# Patient Record
Sex: MALE | Race: WHITE | NOT HISPANIC OR LATINO | Employment: OTHER | URBAN - METROPOLITAN AREA
[De-identification: names, ages, dates, MRNs, and addresses within clinical notes are randomized per-mention and may not be internally consistent; named-entity substitution may affect disease eponyms.]

---

## 2022-11-15 ENCOUNTER — APPOINTMENT (EMERGENCY)
Dept: RADIOLOGY | Facility: HOSPITAL | Age: 47
End: 2022-11-15

## 2022-11-15 ENCOUNTER — HOSPITAL ENCOUNTER (INPATIENT)
Facility: HOSPITAL | Age: 47
LOS: 1 days | Discharge: HOME | End: 2022-11-16
Attending: EMERGENCY MEDICINE | Admitting: STUDENT IN AN ORGANIZED HEALTH CARE EDUCATION/TRAINING PROGRAM

## 2022-11-15 ENCOUNTER — APPOINTMENT (INPATIENT)
Dept: CT IMAGING | Facility: HOSPITAL | Age: 47
End: 2022-11-15

## 2022-11-15 DIAGNOSIS — I16.0 HYPERTENSIVE URGENCY: ICD-10-CM

## 2022-11-15 DIAGNOSIS — N18.6 ESRD (END STAGE RENAL DISEASE) (HCC): ICD-10-CM

## 2022-11-15 DIAGNOSIS — I10 HYPERTENSION: Primary | ICD-10-CM

## 2022-11-15 DIAGNOSIS — R77.8 ELEVATED TROPONIN: ICD-10-CM

## 2022-11-15 DIAGNOSIS — R10.9 ABDOMINAL PAIN, UNSPECIFIED ABDOMINAL LOCATION: ICD-10-CM

## 2022-11-15 PROBLEM — E11.9 T2DM (TYPE 2 DIABETES MELLITUS) (HCC): Status: ACTIVE | Noted: 2022-01-01

## 2022-11-15 PROBLEM — R05.9 COUGH: Status: ACTIVE | Noted: 2022-11-15

## 2022-11-15 PROBLEM — I50.9 CHF (CONGESTIVE HEART FAILURE) (HCC): Status: ACTIVE | Noted: 2022-11-15

## 2022-11-15 LAB
2HR DELTA HS TROPONIN: 6 NG/L
4HR DELTA HS TROPONIN: 3 NG/L
ALBUMIN SERPL BCP-MCNC: 4.4 G/DL (ref 3.5–5)
ALP SERPL-CCNC: 64 U/L (ref 46–116)
ALT SERPL W P-5'-P-CCNC: 11 U/L (ref 12–78)
ANION GAP SERPL CALCULATED.3IONS-SCNC: 12 MMOL/L (ref 4–13)
AST SERPL W P-5'-P-CCNC: 14 U/L (ref 5–45)
BASOPHILS # BLD AUTO: 0.05 THOUSANDS/ÂΜL (ref 0–0.1)
BASOPHILS NFR BLD AUTO: 1 % (ref 0–1)
BILIRUB SERPL-MCNC: 0.92 MG/DL (ref 0.2–1)
BUN SERPL-MCNC: 38 MG/DL (ref 5–25)
CALCIUM SERPL-MCNC: 9.2 MG/DL (ref 8.3–10.1)
CARDIAC TROPONIN I PNL SERPL HS: 46 NG/L
CARDIAC TROPONIN I PNL SERPL HS: 49 NG/L
CARDIAC TROPONIN I PNL SERPL HS: 52 NG/L
CHLORIDE SERPL-SCNC: 93 MMOL/L (ref 96–108)
CO2 SERPL-SCNC: 31 MMOL/L (ref 21–32)
CREAT SERPL-MCNC: 6.45 MG/DL (ref 0.6–1.3)
EOSINOPHIL # BLD AUTO: 0.27 THOUSAND/ÂΜL (ref 0–0.61)
EOSINOPHIL NFR BLD AUTO: 6 % (ref 0–6)
ERYTHROCYTE [DISTWIDTH] IN BLOOD BY AUTOMATED COUNT: 13.8 % (ref 11.6–15.1)
FLUAV RNA RESP QL NAA+PROBE: NEGATIVE
FLUBV RNA RESP QL NAA+PROBE: NEGATIVE
GFR SERPL CREATININE-BSD FRML MDRD: 9 ML/MIN/1.73SQ M
GLUCOSE SERPL-MCNC: 90 MG/DL (ref 65–140)
HCT VFR BLD AUTO: 37.6 % (ref 36.5–49.3)
HGB BLD-MCNC: 11.9 G/DL (ref 12–17)
IMM GRANULOCYTES # BLD AUTO: 0.01 THOUSAND/UL (ref 0–0.2)
IMM GRANULOCYTES NFR BLD AUTO: 0 % (ref 0–2)
LYMPHOCYTES # BLD AUTO: 0.48 THOUSANDS/ÂΜL (ref 0.6–4.47)
LYMPHOCYTES NFR BLD AUTO: 11 % (ref 14–44)
MCH RBC QN AUTO: 30.6 PG (ref 26.8–34.3)
MCHC RBC AUTO-ENTMCNC: 31.6 G/DL (ref 31.4–37.4)
MCV RBC AUTO: 97 FL (ref 82–98)
MONOCYTES # BLD AUTO: 0.38 THOUSAND/ÂΜL (ref 0.17–1.22)
MONOCYTES NFR BLD AUTO: 8 % (ref 4–12)
NEUTROPHILS # BLD AUTO: 3.31 THOUSANDS/ÂΜL (ref 1.85–7.62)
NEUTS SEG NFR BLD AUTO: 74 % (ref 43–75)
NRBC BLD AUTO-RTO: 0 /100 WBCS
PLATELET # BLD AUTO: 104 THOUSANDS/UL (ref 149–390)
PMV BLD AUTO: 11.1 FL (ref 8.9–12.7)
POTASSIUM SERPL-SCNC: 3.8 MMOL/L (ref 3.5–5.3)
PROT SERPL-MCNC: 7.5 G/DL (ref 6.4–8.4)
RBC # BLD AUTO: 3.89 MILLION/UL (ref 3.88–5.62)
RSV RNA RESP QL NAA+PROBE: NEGATIVE
SARS-COV-2 RNA RESP QL NAA+PROBE: NEGATIVE
SODIUM SERPL-SCNC: 136 MMOL/L (ref 135–147)
WBC # BLD AUTO: 4.5 THOUSAND/UL (ref 4.31–10.16)

## 2022-11-15 PROCEDURE — 5A1D70Z PERFORMANCE OF URINARY FILTRATION, INTERMITTENT, LESS THAN 6 HOURS PER DAY: ICD-10-PCS | Performed by: STUDENT IN AN ORGANIZED HEALTH CARE EDUCATION/TRAINING PROGRAM

## 2022-11-15 RX ORDER — NIFEDIPINE 60 MG/1
60 TABLET, EXTENDED RELEASE ORAL 2 TIMES DAILY
Status: DISCONTINUED | OUTPATIENT
Start: 2022-11-15 | End: 2022-11-16 | Stop reason: HOSPADM

## 2022-11-15 RX ORDER — CLONIDINE 0.3 MG/24H
1 PATCH, EXTENDED RELEASE TRANSDERMAL WEEKLY
COMMUNITY
End: 2022-11-16 | Stop reason: SDUPTHER

## 2022-11-15 RX ORDER — ISOSORBIDE MONONITRATE 30 MG/1
30 TABLET, EXTENDED RELEASE ORAL DAILY
COMMUNITY
End: 2022-11-16 | Stop reason: SDUPTHER

## 2022-11-15 RX ORDER — MINOXIDIL 2.5 MG/1
2.5 TABLET ORAL 2 TIMES DAILY
COMMUNITY
End: 2022-11-16 | Stop reason: SDUPTHER

## 2022-11-15 RX ORDER — LOSARTAN POTASSIUM 50 MG/1
100 TABLET ORAL DAILY
Status: DISCONTINUED | OUTPATIENT
Start: 2022-11-16 | End: 2022-11-16 | Stop reason: HOSPADM

## 2022-11-15 RX ORDER — LABETALOL 200 MG/1
600 TABLET, FILM COATED ORAL 2 TIMES DAILY
COMMUNITY
End: 2022-11-16 | Stop reason: SDUPTHER

## 2022-11-15 RX ORDER — ATORVASTATIN CALCIUM 40 MG/1
80 TABLET, FILM COATED ORAL
Status: DISCONTINUED | OUTPATIENT
Start: 2022-11-16 | End: 2022-11-16 | Stop reason: HOSPADM

## 2022-11-15 RX ORDER — ATORVASTATIN CALCIUM 80 MG/1
80 TABLET, FILM COATED ORAL DAILY
COMMUNITY
End: 2022-11-16 | Stop reason: SDUPTHER

## 2022-11-15 RX ORDER — HYDROMORPHONE HCL IN WATER/PF 6 MG/30 ML
0.2 PATIENT CONTROLLED ANALGESIA SYRINGE INTRAVENOUS ONCE
Status: COMPLETED | OUTPATIENT
Start: 2022-11-15 | End: 2022-11-15

## 2022-11-15 RX ORDER — ISOSORBIDE MONONITRATE 30 MG/1
30 TABLET, EXTENDED RELEASE ORAL DAILY
Status: DISCONTINUED | OUTPATIENT
Start: 2022-11-16 | End: 2022-11-16 | Stop reason: HOSPADM

## 2022-11-15 RX ORDER — HEPARIN SODIUM 5000 [USP'U]/ML
5000 INJECTION, SOLUTION INTRAVENOUS; SUBCUTANEOUS EVERY 8 HOURS SCHEDULED
Status: DISCONTINUED | OUTPATIENT
Start: 2022-11-15 | End: 2022-11-16 | Stop reason: HOSPADM

## 2022-11-15 RX ORDER — ONDANSETRON 2 MG/ML
4 INJECTION INTRAMUSCULAR; INTRAVENOUS ONCE
Status: COMPLETED | OUTPATIENT
Start: 2022-11-15 | End: 2022-11-15

## 2022-11-15 RX ORDER — LABETALOL 200 MG/1
600 TABLET, FILM COATED ORAL 2 TIMES DAILY
Status: DISCONTINUED | OUTPATIENT
Start: 2022-11-15 | End: 2022-11-16 | Stop reason: HOSPADM

## 2022-11-15 RX ORDER — ACETAMINOPHEN 325 MG/1
650 TABLET ORAL EVERY 6 HOURS PRN
Status: DISCONTINUED | OUTPATIENT
Start: 2022-11-15 | End: 2022-11-16 | Stop reason: HOSPADM

## 2022-11-15 RX ORDER — MINOXIDIL 2.5 MG/1
2.5 TABLET ORAL 2 TIMES DAILY
Status: DISCONTINUED | OUTPATIENT
Start: 2022-11-15 | End: 2022-11-16 | Stop reason: HOSPADM

## 2022-11-15 RX ORDER — DORZOLAMIDE HYDROCHLORIDE AND TIMOLOL MALEATE 20; 5 MG/ML; MG/ML
1 SOLUTION/ DROPS OPHTHALMIC 2 TIMES DAILY
COMMUNITY

## 2022-11-15 RX ORDER — CALCIUM ACETATE 667 MG/1
667 CAPSULE ORAL 2 TIMES DAILY PRN
Status: DISCONTINUED | OUTPATIENT
Start: 2022-11-15 | End: 2022-11-16 | Stop reason: HOSPADM

## 2022-11-15 RX ORDER — FUROSEMIDE 80 MG
80 TABLET ORAL 2 TIMES DAILY
COMMUNITY
End: 2022-11-16 | Stop reason: SDUPTHER

## 2022-11-15 RX ORDER — HYDRALAZINE HYDROCHLORIDE 20 MG/ML
5 INJECTION INTRAMUSCULAR; INTRAVENOUS EVERY 6 HOURS PRN
Status: DISCONTINUED | OUTPATIENT
Start: 2022-11-15 | End: 2022-11-16 | Stop reason: HOSPADM

## 2022-11-15 RX ORDER — FUROSEMIDE 40 MG/1
80 TABLET ORAL 2 TIMES DAILY
Status: DISCONTINUED | OUTPATIENT
Start: 2022-11-15 | End: 2022-11-16 | Stop reason: HOSPADM

## 2022-11-15 RX ORDER — CALCIUM ACETATE 667 MG/1
TABLET ORAL 2 TIMES DAILY PRN
COMMUNITY

## 2022-11-15 RX ORDER — METOCLOPRAMIDE HYDROCHLORIDE 5 MG/ML
5 INJECTION INTRAMUSCULAR; INTRAVENOUS ONCE
Status: COMPLETED | OUTPATIENT
Start: 2022-11-15 | End: 2022-11-15

## 2022-11-15 RX ORDER — HYDRALAZINE HYDROCHLORIDE 20 MG/ML
20 INJECTION INTRAMUSCULAR; INTRAVENOUS ONCE
Status: COMPLETED | OUTPATIENT
Start: 2022-11-15 | End: 2022-11-15

## 2022-11-15 RX ORDER — ALBUTEROL SULFATE 2.5 MG/3ML
5 SOLUTION RESPIRATORY (INHALATION) ONCE
Status: COMPLETED | OUTPATIENT
Start: 2022-11-15 | End: 2022-11-15

## 2022-11-15 RX ORDER — DIPHENHYDRAMINE HYDROCHLORIDE 50 MG/ML
25 INJECTION INTRAMUSCULAR; INTRAVENOUS ONCE
Status: COMPLETED | OUTPATIENT
Start: 2022-11-15 | End: 2022-11-15

## 2022-11-15 RX ORDER — VALSARTAN 320 MG/1
320 TABLET ORAL DAILY
COMMUNITY
End: 2022-11-16 | Stop reason: SDUPTHER

## 2022-11-15 RX ORDER — NIFEDIPINE 60 MG/1
60 TABLET, FILM COATED, EXTENDED RELEASE ORAL 2 TIMES DAILY
COMMUNITY
End: 2022-11-16 | Stop reason: SDUPTHER

## 2022-11-15 RX ADMIN — ONDANSETRON 4 MG: 2 INJECTION INTRAMUSCULAR; INTRAVENOUS at 20:28

## 2022-11-15 RX ADMIN — METOCLOPRAMIDE 5 MG: 5 INJECTION, SOLUTION INTRAMUSCULAR; INTRAVENOUS at 22:30

## 2022-11-15 RX ADMIN — DIPHENHYDRAMINE HYDROCHLORIDE 25 MG: 50 INJECTION, SOLUTION INTRAMUSCULAR; INTRAVENOUS at 22:28

## 2022-11-15 RX ADMIN — HYDRALAZINE HYDROCHLORIDE 20 MG: 20 INJECTION INTRAMUSCULAR; INTRAVENOUS at 20:05

## 2022-11-15 RX ADMIN — ALBUTEROL SULFATE 5 MG: 2.5 SOLUTION RESPIRATORY (INHALATION) at 20:05

## 2022-11-15 RX ADMIN — HYDROMORPHONE HYDROCHLORIDE 0.2 MG: 0.2 INJECTION, SOLUTION INTRAMUSCULAR; INTRAVENOUS; SUBCUTANEOUS at 22:29

## 2022-11-16 VITALS
RESPIRATION RATE: 28 BRPM | HEIGHT: 68 IN | WEIGHT: 180 LBS | TEMPERATURE: 97.8 F | SYSTOLIC BLOOD PRESSURE: 162 MMHG | DIASTOLIC BLOOD PRESSURE: 96 MMHG | HEART RATE: 90 BPM | OXYGEN SATURATION: 96 % | BODY MASS INDEX: 27.28 KG/M2

## 2022-11-16 PROBLEM — K31.84 GASTROPARESIS: Status: ACTIVE | Noted: 2022-11-15

## 2022-11-16 LAB
ANION GAP SERPL CALCULATED.3IONS-SCNC: 13 MMOL/L (ref 4–13)
BASOPHILS # BLD AUTO: 0.05 THOUSANDS/ÂΜL (ref 0–0.1)
BASOPHILS NFR BLD AUTO: 1 % (ref 0–1)
BUN SERPL-MCNC: 46 MG/DL (ref 5–25)
CALCIUM SERPL-MCNC: 8.8 MG/DL (ref 8.3–10.1)
CHLORIDE SERPL-SCNC: 97 MMOL/L (ref 96–108)
CO2 SERPL-SCNC: 31 MMOL/L (ref 21–32)
CREAT SERPL-MCNC: 7.49 MG/DL (ref 0.6–1.3)
EOSINOPHIL # BLD AUTO: 0.21 THOUSAND/ÂΜL (ref 0–0.61)
EOSINOPHIL NFR BLD AUTO: 4 % (ref 0–6)
ERYTHROCYTE [DISTWIDTH] IN BLOOD BY AUTOMATED COUNT: 13.9 % (ref 11.6–15.1)
GFR SERPL CREATININE-BSD FRML MDRD: 7 ML/MIN/1.73SQ M
GLUCOSE SERPL-MCNC: 71 MG/DL (ref 65–140)
HCT VFR BLD AUTO: 36.3 % (ref 36.5–49.3)
HGB BLD-MCNC: 11.6 G/DL (ref 12–17)
IMM GRANULOCYTES # BLD AUTO: 0.01 THOUSAND/UL (ref 0–0.2)
IMM GRANULOCYTES NFR BLD AUTO: 0 % (ref 0–2)
LYMPHOCYTES # BLD AUTO: 0.69 THOUSANDS/ÂΜL (ref 0.6–4.47)
LYMPHOCYTES NFR BLD AUTO: 14 % (ref 14–44)
MCH RBC QN AUTO: 30.8 PG (ref 26.8–34.3)
MCHC RBC AUTO-ENTMCNC: 32 G/DL (ref 31.4–37.4)
MCV RBC AUTO: 96 FL (ref 82–98)
MONOCYTES # BLD AUTO: 0.55 THOUSAND/ÂΜL (ref 0.17–1.22)
MONOCYTES NFR BLD AUTO: 11 % (ref 4–12)
NEUTROPHILS # BLD AUTO: 3.46 THOUSANDS/ÂΜL (ref 1.85–7.62)
NEUTS SEG NFR BLD AUTO: 70 % (ref 43–75)
NRBC BLD AUTO-RTO: 0 /100 WBCS
PLATELET # BLD AUTO: 114 THOUSANDS/UL (ref 149–390)
PMV BLD AUTO: 10.7 FL (ref 8.9–12.7)
POTASSIUM SERPL-SCNC: 4.9 MMOL/L (ref 3.5–5.3)
RBC # BLD AUTO: 3.77 MILLION/UL (ref 3.88–5.62)
SODIUM SERPL-SCNC: 141 MMOL/L (ref 135–147)
WBC # BLD AUTO: 4.97 THOUSAND/UL (ref 4.31–10.16)

## 2022-11-16 RX ORDER — METOCLOPRAMIDE 10 MG/1
10 TABLET ORAL 4 TIMES DAILY
Qty: 240 TABLET | Refills: 0 | Status: SHIPPED | OUTPATIENT
Start: 2022-11-16 | End: 2023-01-15

## 2022-11-16 RX ORDER — FUROSEMIDE 80 MG
80 TABLET ORAL 2 TIMES DAILY
Qty: 120 TABLET | Refills: 0 | Status: SHIPPED | OUTPATIENT
Start: 2022-11-16 | End: 2023-01-15

## 2022-11-16 RX ORDER — MINOXIDIL 2.5 MG/1
2.5 TABLET ORAL 2 TIMES DAILY
Qty: 120 TABLET | Refills: 0 | Status: SHIPPED | OUTPATIENT
Start: 2022-11-16 | End: 2023-01-15

## 2022-11-16 RX ORDER — CLONIDINE 0.3 MG/24H
1 PATCH, EXTENDED RELEASE TRANSDERMAL WEEKLY
Qty: 8 PATCH | Refills: 0 | Status: SHIPPED | OUTPATIENT
Start: 2022-11-16 | End: 2023-01-15

## 2022-11-16 RX ORDER — DICYCLOMINE HYDROCHLORIDE 10 MG/1
10 CAPSULE ORAL
Qty: 240 CAPSULE | Refills: 0 | Status: SHIPPED | OUTPATIENT
Start: 2022-11-16 | End: 2023-01-15

## 2022-11-16 RX ORDER — NIFEDIPINE 60 MG/1
60 TABLET, FILM COATED, EXTENDED RELEASE ORAL 2 TIMES DAILY
Qty: 120 TABLET | Refills: 0 | Status: SHIPPED | OUTPATIENT
Start: 2022-11-16 | End: 2023-01-15

## 2022-11-16 RX ORDER — ATORVASTATIN CALCIUM 80 MG/1
80 TABLET, FILM COATED ORAL DAILY
Qty: 60 TABLET | Refills: 0 | Status: SHIPPED | OUTPATIENT
Start: 2022-11-16

## 2022-11-16 RX ORDER — ISOSORBIDE MONONITRATE 30 MG/1
30 TABLET, EXTENDED RELEASE ORAL DAILY
Qty: 60 TABLET | Refills: 0 | Status: SHIPPED | OUTPATIENT
Start: 2022-11-16 | End: 2023-01-15

## 2022-11-16 RX ORDER — LABETALOL 200 MG/1
600 TABLET, FILM COATED ORAL 2 TIMES DAILY
Qty: 360 TABLET | Refills: 0 | Status: SHIPPED | OUTPATIENT
Start: 2022-11-16 | End: 2023-01-15

## 2022-11-16 RX ORDER — VALSARTAN 320 MG/1
320 TABLET ORAL DAILY
Qty: 60 TABLET | Refills: 0 | Status: SHIPPED | OUTPATIENT
Start: 2022-11-16 | End: 2023-01-15

## 2022-11-16 RX ADMIN — FUROSEMIDE 80 MG: 40 TABLET ORAL at 00:05

## 2022-11-16 RX ADMIN — LOSARTAN POTASSIUM 100 MG: 50 TABLET, FILM COATED ORAL at 09:33

## 2022-11-16 RX ADMIN — ISOSORBIDE MONONITRATE 30 MG: 30 TABLET, EXTENDED RELEASE ORAL at 09:33

## 2022-11-16 RX ADMIN — MINOXIDIL 2.5 MG: 2.5 TABLET ORAL at 10:23

## 2022-11-16 RX ADMIN — HEPARIN SODIUM 5000 UNITS: 5000 INJECTION INTRAVENOUS; SUBCUTANEOUS at 00:05

## 2022-11-16 RX ADMIN — MINOXIDIL 2.5 MG: 2.5 TABLET ORAL at 00:05

## 2022-11-16 RX ADMIN — HEPARIN SODIUM 5000 UNITS: 5000 INJECTION INTRAVENOUS; SUBCUTANEOUS at 07:10

## 2022-11-16 RX ADMIN — NIFEDIPINE 60 MG: 60 TABLET, FILM COATED, EXTENDED RELEASE ORAL at 10:23

## 2022-11-16 RX ADMIN — NIFEDIPINE 60 MG: 60 TABLET, FILM COATED, EXTENDED RELEASE ORAL at 00:05

## 2022-11-16 RX ADMIN — HYDRALAZINE HYDROCHLORIDE 5 MG: 20 INJECTION INTRAMUSCULAR; INTRAVENOUS at 01:17

## 2022-11-16 RX ADMIN — LABETALOL HYDROCHLORIDE 600 MG: 200 TABLET, FILM COATED ORAL at 00:05

## 2022-11-16 RX ADMIN — LABETALOL HYDROCHLORIDE 600 MG: 200 TABLET, FILM COATED ORAL at 10:23

## 2022-11-16 RX ADMIN — FUROSEMIDE 80 MG: 40 TABLET ORAL at 09:33

## 2022-11-16 NOTE — ASSESSMENT & PLAN NOTE
Lab Results   Component Value Date    EGFR 9 11/15/2022    CREATININE 6 45 (H) 11/15/2022     · Receives dialysis every T, TH, Sat  · Reports compliance with dialysis; last received today (11/15)  · Nephrology consulted for dialysis and volume assistance

## 2022-11-16 NOTE — ASSESSMENT & PLAN NOTE
Lab Results   Component Value Date    HGBA1C 5 2 07/25/2021     · Controlled with diet and exercise   · Continue on discharge

## 2022-11-16 NOTE — UTILIZATION REVIEW
Initial Clinical Review    Admission: Date/Time/Statement:   Admission Orders (From admission, onward)     Ordered        11/15/22 2128  INPATIENT ADMISSION  Once                      Orders Placed This Encounter   Procedures   • INPATIENT ADMISSION     Standing Status:   Standing     Number of Occurrences:   1     Order Specific Question:   Level of Care     Answer:   Med Surg [16]     Order Specific Question:   Estimated length of stay     Answer:   More than 2 Midnights     Order Specific Question:   Certification     Answer:   I certify that inpatient services are medically necessary for this patient for a duration of greater than two midnights  See H&P and MD Progress Notes for additional information about the patient's course of treatment  ED Arrival Information     Expected   -    Arrival   11/15/2022 17:00    Acuity   Emergent            Means of arrival   Walk-In    Escorted by   Self    Service   Hospitalist    Admission type   Emergency            Arrival complaint   HBP (DIALYSIS PT)           Chief Complaint   Patient presents with   • High Blood Pressure     Pt was sent by dialysis d/t increased blood pressure, pt states he has been nauseous and coughing x 3 weeks        Initial Presentation: 50 y o  male to ED from home w/ abd pain , N/V and cough   Persistent cough for the past several days   Developed generalized abd pain and N/V   PMHX ESRD , DM , CHF , HTN   -118 on arrival , given hydralazine in ED dec to 159/78, elevated trop   CT abd wnl   Admitted IP status w/ HTN urgency , CHF plan for prn iv antihypertensives , cont lasix , labetalol , nifedipine, valsartan , clonidine patch and monitor BP   Trend trop , monitor I&O , weights   Prn pain control , clear liq diet   DM control w/ diet and exercise  ESRD nephrology consulted for dialysis   PE: abd tenderness , BLE edema     11/16 Nephrology Consult   ESRD on HD OP HD on T, th, sat    Completed a full treatment of hemodialysis yesterday, left slightly above target weight 83 4 kg (target weight currently set at 82 kg)  Chest x-ray with no evidence of volume overload  [id number] Laurie Ferrell for DC per nephrology   ED Triage Vitals   Temperature Pulse Respirations Blood Pressure SpO2   11/15/22 1706 11/15/22 1706 11/15/22 1706 11/15/22 1706 11/15/22 1706   97 8 °F (36 6 °C) 93 20 (!) 237/117 96 %      Temp Source Heart Rate Source Patient Position - Orthostatic VS BP Location FiO2 (%)   11/15/22 1706 11/15/22 2028 11/15/22 1706 11/15/22 1706 --   Tympanic Monitor Sitting Left arm       Pain Score       --                 Wt Readings from Last 1 Encounters:   11/15/22 81 6 kg (180 lb)     Additional Vital Signs:   11/16/22 0900 -- 90 28 Abnormal  162/96 122 96 % None (Room air) Lying   11/16/22 0800 -- 80 26 Abnormal  167/83 119 98 % None (Room air) Lying   11/16/22 0700 -- 80 24 Abnormal  162/76 109 94 % None (Room air) Lying   11/16/22 0600 -- 80 26 Abnormal  155/70 101 96 % None (Room air) Lying   11/16/22 0500 -- 80 36 Abnormal  139/65 93 98 % None (Room air) Lying   11/16/22 0400 -- 80 18 154/74 107 96 % None (Room air) Lying   11/16/22 0230 -- 82 19 141/75 102 97 % None (Room air) Lying   11/16/22 0150 -- 81 18 128/66 -- 96 % None (Room air) Lying   11/16/22 0117 -- 82 18 187/88 Abnormal  -- 98 % None (Room air) Lying   11/16/22 0005 -- 90 21 181/91 Abnormal  -- 97 % None (Room air) Lying   11/15/22 2101 -- 90 19 159/78 -- 98 % None (Room air) Lying   11/15/22 2046 -- -- -- 190/85 Abnormal  -- -- -- Lying   11/15/22 2028 -- 88 19 210/95 Abnormal  -- 98 % None (Room air) Lying       Pertinent Labs/Diagnostic Test Results:   CT abdomen pelvis wo contrast   Final Result by Tarah Varma MD (11/15 9609)      No evidence of acute intra-abdominal pelvic pathology  Mild ascites  Splenomegaly              Workstation performed: [id number]         XR chest 1 view portable   Final Result by Juliette Cortes MD (92/77 [id number])      No acute consolidation or congestion      Mild increased lung markings likely due to hypoinflation            Workstation performed: [id number]           Results from last 7 days   Lab Units 11/15/22  2006   SARS-COV-2  Negative     Results from last 7 days   Lab Units 11/16/22  0537 11/15/22  1714   WBC Thousand/uL 4 97 4 50   HEMOGLOBIN g/dL 11 6* 11 9*   HEMATOCRIT % 36 3* 37 6   PLATELETS Thousands/uL 114* 104*   NEUTROS ABS Thousands/µL 3 46 3 31     Results from last 7 days   Lab Units 11/16/22  0537 11/15/22  1714   SODIUM mmol/L 141 136   POTASSIUM mmol/L 4 9 3 8   CHLORIDE mmol/L 97 93*   CO2 mmol/L 31 31   ANION GAP mmol/L 13 12   BUN mg/dL 46* 38*   CREATININE mg/dL 7 49* 6 45*   EGFR ml/min/1 73sq m 7 9   CALCIUM mg/dL 8 8 9 2     Results from last 7 days   Lab Units 11/15/22  1714   AST U/L 14   ALT U/L 11*   ALK PHOS U/L 64   TOTAL PROTEIN g/dL 7 5   ALBUMIN g/dL 4 4   TOTAL BILIRUBIN mg/dL 0 92         Results from last 7 days   Lab Units 11/16/22  0537 11/15/22  1714   GLUCOSE RANDOM mg/dL 71 90     Results from last 7 days   Lab Units 11/15/22  2101 11/15/22  2006 11/15/22  1714   HS TNI 0HR ng/L  --   --  46   HS TNI 2HR ng/L  --  52*  --    HSTNI D2 ng/L  --  6  --    HS TNI 4HR ng/L 49  --   --    HSTNI D4 ng/L 3  --   --          Results from last 7 days   Lab Units 11/15/22  2006   INFLUENZA A PCR  Negative   INFLUENZA B PCR  Negative   RSV PCR  Negative       ED Treatment:   Medication Administration from 11/15/2022 1659 to 11/16/2022 1107       Date/Time Order Dose Route Action     11/15/2022 2005 EST hydrALAZINE (APRESOLINE) injection 20 mg 20 mg Intravenous Given     11/15/2022 2005 EST albuterol inhalation solution 5 mg 5 mg Nebulization Given     11/15/2022 2028 EST ondansetron (ZOFRAN) injection 4 mg 4 mg Intravenous Given     11/16/2022 0933 EST furosemide (LASIX) tablet 80 mg 80 mg Oral Given     11/16/2022 0005 EST furosemide (LASIX) tablet 80 mg 80 mg Oral Given     11/16/2022 0933 EST isosorbide mononitrate (IMDUR) 24 hr tablet 30 mg 30 mg Oral Given     11/16/2022 1023 EST labetalol (NORMODYNE) tablet 600 mg 600 mg Oral Given     11/16/2022 0005 EST labetalol (NORMODYNE) tablet 600 mg 600 mg Oral Given     11/16/2022 1023 EST minoxidil (LONITEN) tablet 2 5 mg 2 5 mg Oral Given     11/16/2022 0005 EST minoxidil (LONITEN) tablet 2 5 mg 2 5 mg Oral Given     11/16/2022 1023 EST NIFEdipine (PROCARDIA XL) 24 hr tablet 60 mg 60 mg Oral Given     11/16/2022 0005 EST NIFEdipine (PROCARDIA XL) 24 hr tablet 60 mg 60 mg Oral Given     11/16/2022 0933 EST losartan (COZAAR) tablet 100 mg 100 mg Oral Given     11/16/2022 0710 EST heparin (porcine) subcutaneous injection 5,000 Units 5,000 Units Subcutaneous Given     11/16/2022 0005 EST heparin (porcine) subcutaneous injection 5,000 Units 5,000 Units Subcutaneous Given     11/16/2022 0117 EST hydrALAZINE (APRESOLINE) injection 5 mg 5 mg Intravenous Given     11/15/2022 2230 EST metoclopramide (REGLAN) injection 5 mg 5 mg Intravenous Given     11/15/2022 2228 EST diphenhydrAMINE (BENADRYL) injection 25 mg 25 mg Intravenous Given     11/15/2022 2229 EST HYDROmorphone HCl (DILAUDID) injection 0 2 mg 0 2 mg Intravenous Given        Past Medical History:   Diagnosis Date   • Hypertension    • Renal disorder      Present on Admission:  **None**      Admitting Diagnosis: Hypertension [I10]  Age/Sex: 50 y o  male  Admission Orders:  Scheduled Medications:  atorvastatin, 80 mg, Oral, Daily With Dinner  furosemide, 80 mg, Oral, BID  heparin (porcine), 5,000 Units, Subcutaneous, Q8H [address]  isosorbide mononitrate, 30 mg, Oral, Daily  labetalol, 600 mg, Oral, BID  losartan, 100 mg, Oral, Daily  minoxidil, 2 5 mg, Oral, BID  NIFEdipine, 60 mg, Oral, BID      Continuous IV Infusions:     PRN Meds:  acetaminophen, 650 mg, Oral, Q6H PRN  calcium acetate, 667 mg, Oral, BID PRN  hydrALAZINE, 5 mg, Intravenous, Q6H PRN  11/16  x1    Clear liq diet   Fld restriction 1500 ml   I&O   Weights qd  Up as kirit   Tele     IP CONSULT TO NEPHROLOGY    Network Utilization Review Department  ATTENTION: Please call with any questions or concerns to [phone] and carefully listen to the prompts so that you are directed to the right person  All voicemails are confidential   Sudie Crigler all requests for admission clinical reviews, approved or denied determinations and any other requests to dedicated fax number below belonging to the campus where the patient is receiving treatment   List of dedicated fax numbers for the Facilities:  [address] DENIALS (Administrative/Medical Necessity) [phone]   [address] (Maternity/NICU/Pediatrics) [phone]   [address] Mayte Mendoza [phone]   [place] [address] [phone]   [address] [place] [address] [facility]anka Rojas [facility] [phone]   [address] [facility] [address] 815 [facility] [phone]

## 2022-11-16 NOTE — ASSESSMENT & PLAN NOTE
Lab Results   Component Value Date    EGFR 7 11/16/2022    EGFR 9 11/15/2022    CREATININE 7 49 (H) 11/16/2022    CREATININE 6 45 (H) 11/15/2022     · Receives dialysis every T, TH, Sat  · Reports compliance with dialysis; last received today (11/15)  · Nephrology consulted for dialysis and volume assistance   · At this time cleared for discharge, recommendation to continue home meds  · Referral to [facility]  Nephrology in discharge packet

## 2022-11-16 NOTE — ASSESSMENT & PLAN NOTE
· Reporting generalized abd pain and bloating  · Associated N/V (nonbloody non-bilious)  · Reports regular BM's   · Obese abdomen, normoactive bowel sounds, soft, with generalized tenderness  · Suspecting musculoskeletal pain in setting of repeated coughing as above however will check CT abd/pelvis to r/o other acute process  · Clear liquid diet overnight until N/V and pain are better controlled   · PRN pain control

## 2022-11-16 NOTE — ASSESSMENT & PLAN NOTE
Lab Results   Component Value Date    HGBA1C 5 2 07/25/2021     · BG 90  · Controlled with diet and exercise   · Monitor BG, if becoming elevated consider correctional SSI

## 2022-11-16 NOTE — H&P
[address] [facility]  H&P- Pollyjenny Lepe 1975, 50 y o  male MRN: [id number]  Unit/Bed#: FT 02 Encounter: [id number]  Primary Care Provider: No primary care provider on file  Date and time admitted to hospital: 11/15/2022  7:11 PM    * Hypertensive urgency  Assessment & Plan  Patient reports persistent cough for the past several days  Over this timeframe he's also developed generalized abd pain and N/V  Currently reports feeling nauseated and is experiencing generalized abd pain  Denies other symptom/complaint at this time       /78 (BP Location: Left arm)   Pulse 90   Temp 97 8 °F (36 6 °C) (Tympanic)   Resp 19   Ht 5' 8" (1 727 m)   Wt 81 6 kg (180 lb)   SpO2 98%   BMI 27 37 kg/m²       · BP as high as 237/117 on ED presentation  · No new neurologic findings on exam  · Currently 159/78 after 20mg IV hydralzine in ED   · Patient reports missing home BP meds due to N/V at home   · Continue home lasix, labetolol, minoxidil, nifedipine, valsartan, and clonidine patch   · PRN IV antihypertensives as appropriate  · Monitor BP closely overnight     CHF (congestive heart failure) ([facility])  Assessment & Plan  Wt Readings from Last 3 Encounters:   11/15/22 81 6 kg (180 lb)     · Physical exam and CXR suggestive of some mild volume retention  · Not hypoxic on RA  · Continue home lasix 80mg BID  · I/O monitoring, daily standing scale weights  · Low sodium fluid restricted diet   · Nephrology consulted for dialysis/volume management assistance         Elevated troponin  Assessment & Plan  · Troponin 46; 2H:delta 52  · EKG NSR HR 89  · Denies chest pain  · Suspected to the be in the setting of hypertensive urgency and volume retention as above  · Trend remainder of troponin/EKG   · Continue home daily Statin and BB    Abdominal pain  Assessment & Plan  · Reporting generalized abd pain and bloating  · Associated N/V (nonbloody non-bilious)  · Reports regular BM's   · Obese abdomen, normoactive bowel sounds, soft, with generalized tenderness  · Suspecting musculoskeletal pain in setting of repeated coughing as above however will check CT abd/pelvis to r/o other acute process  · Clear liquid diet overnight until N/V and pain are better controlled   · PRN pain control     Cough  Assessment & Plan  · Reporting cough "for a while"   · COVID negative   · Possibly in setting of volume retention  · Tessalon pearles ordered     T2DM (type 2 diabetes mellitus) ([facility] [address] )  Assessment & Plan  Lab Results   Component Value Date    HGBA1C 5 2 07/25/2021     · BG 90  · Controlled with diet and exercise   · Monitor BG, if becoming elevated consider correctional SSI      ESRD (end stage renal disease) [facility])  Assessment & Plan  Lab Results   Component Value Date    EGFR 9 11/15/2022    CREATININE 6 45 (H) 11/15/2022     · Receives dialysis every T, TH, Sat  · Reports compliance with dialysis; last received today (11/15)  · Nephrology consulted for dialysis and volume assistance     VTE Pharmacologic Prophylaxis: VTE Score: 3 Moderate Risk (Score 3-4) - Pharmacological DVT Prophylaxis Ordered: heparin  Code Status: Level 1 - Full Code   Discussion with family: update in AM      Anticipated Length of Stay: Patient will be admitted on an inpatient basis with an anticipated length of stay of greater than 2 midnights secondary to hypertensive urgency, volume overload, and elevated troponin  Total Time for Visit, including Counseling / Coordination of Care: 45 minutes Greater than 50% of this total time spent on direct patient counseling and coordination of care  Chief Complaint: abd pain, N/V, and cough    History of Present Illness:  Eliza Wilks is a 50 y o  male with a PMH of ESRD, T2DM, CHF, and HTN who presents with abd pain, N/V, and cough  Patient reports persistent cough for the past several days  Over this timeframe he's also developed generalized abd pain and N/V   Currently reports feeling nauseated and is experiencing generalized abd pain  Denies other symptom/complaint at this time  All questions answered at the bedside to the best of my ability  Review of Systems:  Review of Systems   Constitutional: Negative for activity change, appetite change, chills, diaphoresis, fatigue and fever  HENT: Negative for congestion, ear pain, nosebleeds and trouble swallowing  Eyes: Negative for pain and visual disturbance  Respiratory: Negative for apnea, cough, chest tightness, shortness of breath and wheezing  Cardiovascular: Negative for chest pain, palpitations and leg swelling  Gastrointestinal: Negative for abdominal distention, abdominal pain, blood in stool, constipation, diarrhea, nausea and vomiting  Endocrine: Negative for cold intolerance, heat intolerance and polyuria  Genitourinary: Negative for difficulty urinating, dysuria, flank pain and hematuria  Musculoskeletal: Negative for arthralgias, neck pain and neck stiffness  Skin: Negative for color change, rash and wound  Neurological: Negative for dizziness, tremors, syncope, weakness, light-headedness, numbness and headaches  Hematological: Negative for adenopathy  All other systems reviewed and are negative  Past Medical and Surgical History:   Past Medical History:   Diagnosis Date   • Hypertension    • Renal disorder        History reviewed  No pertinent surgical history  Meds/Allergies:  Prior to Admission medications    Medication Sig Start Date End Date Taking?  Authorizing Provider   atorvastatin (LIPITOR) 80 mg tablet Take 80 mg by mouth daily   Yes Historical Provider, MD   calcium acetate (CALPHRON) 667 mg Take by mouth 2 (two) times a day as needed (With snacks)   Yes Historical Provider, MD   cloNIDine (CATAPRES-TTS-3) 0 3 mg/24 hr Place 1 patch on the skin once a week   Yes Historical Provider, MD   dorzolamide-timolol (COSOPT) 22 3-6 8 MG/ML ophthalmic solution Administer 1 drop to both eyes 2 (two) times a day   Yes Historical Provider, MD   furosemide (LASIX) 80 mg tablet Take 80 mg by mouth 2 (two) times a day   Yes Historical Provider, MD   isosorbide mononitrate (IMDUR) 30 mg 24 hr tablet Take 30 mg by mouth daily   Yes Historical Provider, MD   labetalol (NORMODYNE) 200 mg tablet Take 600 mg by mouth 2 (two) times a day   Yes Historical Provider, MD   minoxidil (LONITEN) 2 5 mg tablet Take 2 5 mg by mouth 2 (two) times a day   Yes Historical Provider, MD   NIFEdipine ER (ADALAT CC) 60 MG 24 hr tablet Take 60 mg by mouth 2 (two) times a day   Yes Historical Provider, MD   valsartan (DIOVAN) 320 MG tablet Take 320 mg by mouth daily   Yes Historical Provider, MD     I have reviewed home medications with patient personally  Allergies: No Known Allergies    Social History:  Marital Status: Single   Occupation: N/A  Patient Pre-hospital Living Situation: Home  Patient Pre-hospital Level of Mobility: walks  Patient Pre-hospital Diet Restrictions: diabetic   Substance Use History:   Social History     Substance and Sexual Activity   Alcohol Use None     Social History     Tobacco Use   Smoking Status Never Smoker   Smokeless Tobacco Never Used     Social History     Substance and Sexual Activity   Drug Use Not on file       Family History:  History reviewed  No pertinent family history  Physical Exam:     Vitals:   Blood Pressure: 159/78 (11/15/22 2101)  Pulse: 90 (11/15/22 2101)  Temperature: 97 8 °F (36 6 °C) (11/15/22 1706)  Temp Source: Tympanic (11/15/22 1706)  Respirations: 19 (11/15/22 2101)  Height: 5' 8" (172 7 cm) (11/15/22 1706)  Weight - Scale: 81 6 kg (180 lb) (11/15/22 1706)  SpO2: 98 % (11/15/22 2101)    Physical Exam  Vitals and nursing note reviewed  Constitutional:       General: He is not in acute distress  Appearance: Normal appearance  HENT:      Head: Normocephalic and atraumatic        Right Ear: External ear normal       Left Ear: External ear normal       Nose: Nose normal       Mouth/Throat: Mouth: Mucous membranes are moist    Eyes:      Pupils: Pupils are equal, round, and reactive to light  Cardiovascular:      Rate and Rhythm: Normal rate and regular rhythm  Pulses: Normal pulses  Heart sounds: Normal heart sounds  No murmur heard  Pulmonary:      Effort: Pulmonary effort is normal  No respiratory distress  Breath sounds: Normal breath sounds  No wheezing or rales  Chest:      Chest wall: No tenderness  Abdominal:      General: Bowel sounds are normal  There is no distension  Palpations: Abdomen is soft  There is no mass  Tenderness: There is abdominal tenderness (generalized )  There is no guarding  Comments: Obese abdomen      Musculoskeletal:         General: No swelling or tenderness  Cervical back: Normal range of motion and neck supple  No rigidity or tenderness  Right lower leg: Edema present  Left lower leg: Edema present  Skin:     General: Skin is warm and dry  Capillary Refill: Capillary refill takes less than 2 seconds  Findings: No lesion or rash  Neurological:      General: No focal deficit present  Mental Status: He is alert     Psychiatric:         Mood and Affect: Mood normal           Additional Data:     Lab Results:  Results from last 7 days   Lab Units 11/15/22  1714   WBC Thousand/uL 4 50   HEMOGLOBIN g/dL 11 9*   HEMATOCRIT % 37 6   PLATELETS Thousands/uL 104*   NEUTROS PCT % 74   LYMPHS PCT % 11*   MONOS PCT % 8   EOS PCT % 6     Results from last 7 days   Lab Units 11/15/22  1714   SODIUM mmol/L 136   POTASSIUM mmol/L 3 8   CHLORIDE mmol/L 93*   CO2 mmol/L 31   BUN mg/dL 38*   CREATININE mg/dL 6 45*   ANION GAP mmol/L 12   CALCIUM mg/dL 9 2   ALBUMIN g/dL 4 4   TOTAL BILIRUBIN mg/dL 0 92   ALK PHOS U/L 64   ALT U/L 11*   AST U/L 14   GLUCOSE RANDOM mg/dL 90                       Imaging: Personally reviewed the following imaging: chest xray  XR chest 1 view portable    (Results Pending)   CT abdomen pelvis wo contrast    (Results Pending)       EKG and Other Studies Reviewed on Admission:   · EKG: EKG reviewed   ** Please Note: This note has been constructed using a voice recognition system   **

## 2022-11-16 NOTE — ASSESSMENT & PLAN NOTE
Wt Readings from Last 3 Encounters:   11/15/22 81 6 kg (180 lb)     · Physical exam and CXR suggestive of some mild volume retention  · Not hypoxic on RA  · Continue home lasix 80mg BID  · I/O monitoring, daily standing scale weights  · Low sodium fluid restricted diet   · Nephrology consulted for dialysis/volume management assistance

## 2022-11-16 NOTE — DISCHARGE SUMMARY
[facility]  Discharge- Scar Guy 1975, 50 y o  male MRN: [id number]  Unit/Bed#: ED 25 Encounter: [id number]  Primary Care Provider: No primary care provider on file  Date and time admitted to hospital: 11/15/2022  7:11 PM    * Hypertensive urgency  Assessment & Plan  Patient reports persistent cough for the past several days  Over this timeframe he's also developed generalized abd pain and N/V  Currently reports feeling nauseated and is experiencing generalized abd pain  Denies other symptom/complaint at this time       /96 (BP Location: Right arm)   Pulse 90   Temp 97 8 °F (36 6 °C) (Tympanic)   Resp (!) 28   Ht 5' 8" (1 727 m)   Wt 81 6 kg (180 lb)   SpO2 96%   BMI 27 37 kg/m²       · BP as high as 237/117 on ED presentation  · No new neurologic findings on exam  · Currently 159/78 after 20mg IV hydralzine in ED   · Patient reports missing home BP meds due to N/V at home   · Continue home lasix, labetolol, minoxidil, nifedipine, valsartan, and clonidine patch   · Per nephrology, stable for discharge on home meds    Gastroparesis  Assessment & Plan  · Reporting generalized abd pain and bloating  · Associated N/V (nonbloody non-bilious)  · Has likely diabetic gastroparesis   · New to the area, relocated from [place]  · Referred to GI for outpatient follow up  · Prescribed bentyl and reglan  · Tolerating diet  · Stable for discharge     Cough  Assessment & Plan  · Reporting cough "for a while"   · COVID negative   · Possibly in setting of volume retention  · Tessalon pearles ordered     Elevated troponin  Assessment & Plan  · Troponin 46; 2H:delta 52  · EKG NSR HR 89  · Denies chest pain  · Suspected to the be in the setting of hypertensive urgency and volume retention as above  · Now stable for discharge     [facility]type 2 diabetes mellitus) [facility] )  Assessment & Plan  Lab Results   Component Value Date    HGBA1C 5 2 07/25/2021     · Controlled with diet and exercise · Continue on discharge       CHF (congestive heart failure) ([facility] )  Assessment & Plan  Wt Readings from Last 3 Encounters:   11/15/22 81 6 kg (180 lb)     · Physical exam and CXR suggestive of some mild volume retention  · Not hypoxic on RA  · Continue home lasix 80mg BID  · Euvolemic on discharge        ESRD (end stage renal disease) [facility])  Assessment & Plan  Lab Results   Component Value Date    EGFR 7 2022    EGFR 9 11/15/2022    CREATININE 7 49 (H) 2022    CREATININE 6 45 (H) 11/15/2022     · Receives dialysis every T, TH, Sat  · Reports compliance with dialysis; last received today (11/15)  · Nephrology consulted for dialysis and volume assistance   · At this time cleared for discharge, recommendation to continue home meds  · Referral to [facility] in discharge packet        Medical Problems     Resolved Problems  Date Reviewed: 11/15/2022   None       Discharging Physician / Practitioner: Manuel Becker  PCP: No primary care provider on file  Admission Date:   Admission Orders (From admission, onward)     Ordered        11/15/22 2128  INPATIENT ADMISSION  Once                      Discharge Date: 22    Consultations During Hospital Stay:  · IP CONSULT TO NEPHROLOGY  ·     Procedures Performed:   · imaging    Significant Findings / Test Results:   Principal Problem:    Hypertensive urgency  Active Problems:    Gastroparesis    ESRD (end stage renal disease) (HCC)    CHF (congestive heart failure) (HCC)    T2DM (type 2 diabetes mellitus) (HCC)    Elevated troponin    Cough  Resolved Problems:    * No resolved hospital problems  *  ·   ·     Incidental Findings:   · See above      Test Results Pending at Discharge (will require follow up):    · Na      Outpatient Tests Requested:  · Follow up with GI  · Follow up with PCP- referred to [facility]  · Follow up with Nephrology    Complications:  none    Reason for Admission: hypertensive urgency with nausea and vomiting    Hospital Course:   [place] is a 50 y o  male patient who originally presented to the hospital on 11/15/2022 due to hypertensive urgency in setting of intractable nausea and vomiting secondary to gastroparesis which has now resolved overnight  Stable for discharge with recommendation to follow up with GI, nephrology, and primary care  All referrals in discharge packet  Condition at Discharge: good    Discharge Day Visit / Exam:   * Please refer to separate progress note for these details *      Discharge instructions/Information to patient and family:   See after visit summary for information provided to patient and family  Provisions for Follow-Up Care:  See after visit summary for information related to follow-up care and any pertinent home health orders  Disposition:   Home    Planned Readmission: none      Discharge Statement:  I spent 30+ minutes discharging the patient  This time was spent on the day of discharge  I had direct contact with the patient on the day of discharge  Greater than 50% of the total time was spent examining patient, answering all patient questions, arranging and discussing plan of care with patient as well as directly providing post-discharge instructions  Additional time then spent on discharge activities  Discharge Medications:  See after visit summary for reconciled discharge medications provided to patient and/or family        **Please Note: This note may have been constructed using a voice recognition system**

## 2022-11-16 NOTE — CONSULTS
NEPHROLOGY CONSULTATION NOTE    Patient: Tyron Rosenbaum               Sex: male          DOA: 11/15/2022  7:11 PM   YOB: 1975        Age:  50 y o         LOS:  LOS: 1 day         [address] / CONSULTATION:  ESRD on HD    DATE OF CONSULTATION / SERVICE: 11/16/2022    ADMISSION DIAGNOSIS: Hypertensive urgency     CHIEF COMPLAINT     Nausea vomiting    HPI     Tyron Rosenbaum is a 50 y o  male  with a past medical history of hypertension, gastroparesis, type 2 diabetes, ESRD on HD, CHF, and was admitted to [facility] [place] after presenting with abdominal pain, nausea, vomiting, and cough over the past several weeks  Per patient, he reports that he was having difficulty maintaining oral intake and was unable to take his medications at home due to vomiting  He was admitted with hypertensive urgency with a blood pressure of 237/117 on admission  Following administration of IV hydralazine and resumption of home medical regimen, blood pressure readings improved  CT of the abdomen on admission was unremarkable for acute intra-abdominal pathology  He was advanced to a liquid diet and as of this morning is tolerating  Nephrology was consulted for assistance in management of ESRD on HD  Patient currently undergoes regular hemodialysis at [facility] on a Tuesday, Thursday, Saturday schedule  He received his last full hemodialysis treatment yesterday, and he reported to the ER for further workup of symptoms after he completed treatment  Current target weight is set at 82 kg, patient left treatment approximately 1 kg over target  Chest x-ray in the ER was unremarkable for volume overload  He remains on room air, denies shortness of breath, oxygen saturations 96% and above  No evidence of acidosis or hyperkalemia on current labs  Reviewed past 24 hour events      PAST MEDICAL HISTORY     Past Medical History:   Diagnosis Date   • Hypertension    • Renal disorder PAST SURGICAL HISTORY     History reviewed  No pertinent surgical history  ALLERGIES     No Known Allergies    SOCIAL HISTORY     Social History     Substance and Sexual Activity   Alcohol Use None     Social History     Substance and Sexual Activity   Drug Use Not on file     Social History     Tobacco Use   Smoking Status Never   Smokeless Tobacco Never       FAMILY HISTORY     History reviewed  No pertinent family history      CURRENT MEDICATIONS       Current Facility-Administered Medications:   •  acetaminophen (TYLENOL) tablet 650 mg, 650 mg, Oral, Q6H PRN, Katina Ivanoff, PA-C  •  atorvastatin (LIPITOR) tablet 80 mg, 80 mg, Oral, Daily With Tanesha Wadsworth PA-C  •  calcium acetate (PHOSLO) capsule 667 mg, 667 mg, Oral, BID PRN, Katina Ivanoff, PA-C  •  furosemide (LASIX) tablet 80 mg, 80 mg, Oral, BID, Katina Ivanoff, PA-C, 80 mg at 11/16/22 2860  •  heparin (porcine) subcutaneous injection 5,000 Units, 5,000 Units, Subcutaneous, Q8H [address], Katina Ivanoff, PA-C, 5,000 Units at 11/16/22 0710  •  hydrALAZINE (APRESOLINE) injection 5 mg, 5 mg, Intravenous, Q6H PRN, Katina Ivanoff, PA-C, 5 mg at 11/16/22 0117  •  isosorbide mononitrate (IMDUR) 24 hr tablet 30 mg, 30 mg, Oral, Daily, Katina Ivanoff, PA-C, 30 mg at 11/16/22 3871  •  labetalol (NORMODYNE) tablet 600 mg, 600 mg, Oral, BID, Katina Ivanoff, PA-C, 600 mg at 11/16/22 1023  •  losartan (COZAAR) tablet 100 mg, 100 mg, Oral, Daily, Katina Ivanoff, PA-C, 100 mg at 11/16/22 0855  •  minoxidil (LONITEN) tablet 2 5 mg, 2 5 mg, Oral, BID, Katina Ivanoff, PA-C, 2 5 mg at 11/16/22 1023  •  NIFEdipine (PROCARDIA XL) 24 hr tablet 60 mg, 60 mg, Oral, BID, Katina Ivanoff, PA-C, 60 mg at 11/16/22 1023    Current Outpatient Medications:   •  atorvastatin (LIPITOR) 80 mg tablet, Take 1 tablet (80 mg total) by mouth daily, Disp: 60 tablet, Rfl: 0  •  calcium acetate (CALPHRON) 667 mg, Take by mouth 2 (two) times a day as needed (With snacks), Disp: , Rfl:   •  cloNIDine (CATAPRES-TTS-3) 0 3 mg/24 hr, Place 1 patch (0 3 mg total) on the skin once a week, Disp: 8 patch, Rfl: 0  •  dicyclomine (BENTYL) 10 mg capsule, Take 1 capsule (10 mg total) by mouth 4 (four) times a day (before meals and at bedtime), Disp: 240 capsule, Rfl: 0  •  dorzolamide-timolol (COSOPT) 22 3-6 8 MG/ML ophthalmic solution, Administer 1 drop to both eyes 2 (two) times a day, Disp: , Rfl:   •  furosemide (LASIX) 80 mg tablet, Take 1 tablet (80 mg total) by mouth 2 (two) times a day, Disp: 120 tablet, Rfl: 0  •  isosorbide mononitrate (IMDUR) 30 mg 24 hr tablet, Take 1 tablet (30 mg total) by mouth daily, Disp: 60 tablet, Rfl: 0  •  labetalol (NORMODYNE) 200 mg tablet, Take 3 tablets (600 mg total) by mouth 2 (two) times a day, Disp: 360 tablet, Rfl: 0  •  metoclopramide (Reglan) 10 mg tablet, Take 1 tablet (10 mg total) by mouth 4 (four) times a day, Disp: 240 tablet, Rfl: 0  •  minoxidil (LONITEN) 2 5 mg tablet, Take 1 tablet (2 5 mg total) by mouth 2 (two) times a day, Disp: 120 tablet, Rfl: 0  •  NIFEdipine ER (ADALAT CC) 60 MG 24 hr tablet, Take 1 tablet (60 mg total) by mouth 2 (two) times a day, Disp: 120 tablet, Rfl: 0  •  valsartan (DIOVAN) 320 MG tablet, Take 1 tablet (320 mg total) by mouth daily, Disp: 60 tablet, Rfl: 0    REVIEW OF SYSTEMS   Review of Systems   Constitutional: Positive for activity change  Negative for chills, fatigue and fever  HENT: Negative for hearing loss, nosebleeds and trouble swallowing  Respiratory: Negative for cough and shortness of breath  Cardiovascular: Negative for chest pain and leg swelling  Gastrointestinal: Positive for nausea  Negative for constipation, diarrhea and vomiting  Genitourinary: Negative for difficulty urinating, dysuria, frequency and hematuria  Musculoskeletal: Negative for back pain  Skin: Negative for pallor  Neurological: Negative for dizziness, syncope, weakness and light-headedness  Psychiatric/Behavioral: Negative for confusion and sleep disturbance  The patient is not nervous/anxious  OBJECTIVE     Current Weight: Weight - Scale: 81 6 kg (180 lb)  Vitals:    11/16/22 0900   BP: 162/96   Pulse: 90   Resp: (!) 28   Temp:    SpO2: 96%     Body mass index is 27 37 kg/m²  No intake or output data in the 24 hours ending 11/16/22 1103    PHYSICAL EXAMINATION     Physical Exam  Vitals reviewed  Constitutional:       General: He is not in acute distress  HENT:      Head: Normocephalic  Mouth/Throat:      Lips: Pink  Mouth: Mucous membranes are moist    Eyes:      General: Lids are normal  No scleral icterus  Cardiovascular:      Rate and Rhythm: Normal rate and regular rhythm  Heart sounds: S1 normal and S2 normal    Pulmonary:      Effort: Pulmonary effort is normal  No accessory muscle usage or respiratory distress  Breath sounds: Normal breath sounds  Abdominal:      General: There is no distension  Tenderness: There is no abdominal tenderness  Musculoskeletal:      Cervical back: Normal range of motion and neck supple  No tenderness  Right lower leg: No edema  Left lower leg: No edema  Skin:     General: Skin is warm  Coloration: Skin is not cyanotic or jaundiced  Neurological:      General: No focal deficit present  Mental Status: He is alert and oriented to person, place, and time  Psychiatric:         Attention and Perception: Attention normal          Speech: Speech normal          Behavior: Behavior is cooperative             LAB RESULTS        Results from last 7 days   Lab Units 11/16/22  0537 11/15/22  1714   WBC Thousand/uL 4 97 4 50   HEMOGLOBIN g/dL 11 6* 11 9*   HEMATOCRIT % 36 3* 37 6   PLATELETS Thousands/uL 114* 104*   POTASSIUM mmol/L 4 9 3 8   CHLORIDE mmol/L 97 93*   CO2 mmol/L 31 31   BUN mg/dL 46* 38*   CREATININE mg/dL 7 49* 6 45*   EGFR ml/min/1 73sq m 7 9   CALCIUM mg/dL 8 8 9 2       Recent Labs 11/16/22  0537   WBC 4 97     Recent Labs     11/16/22  0537   HGB 11 6*     Recent Labs     11/16/22  0537   HCT 36 3*     Recent Labs     11/16/22  0537   *     Recent Labs     11/16/22  0537   SODIUM 141     Recent Labs     11/16/22  0537   K 4 9     Recent Labs     11/16/22  0537   CL 97     Recent Labs     11/16/22  0537   CO2 31     Recent Labs     11/16/22  0537   BUN 46*     Recent Labs     11/16/22  0537   CREATININE 7 49*     Recent Labs     11/16/22  0537   EGFR 7     Recent Labs     11/16/22  0537   CALCIUM 8 8     No results for input(s): MG in the last 72 hours  No results for input(s): PHOS in the last 72 hours  Invalid input(s): ALBUMIN  No results for input(s): PROT in the last 72 hours  No results for input(s): GLUCOSE in the last 72 hours  RADIOLOGY RESULTS     Results for orders placed during the hospital encounter of 11/15/22    XR chest 1 view portable    Narrative  CHEST    INDICATION:   cough  COMPARISON:  None    EXAM PERFORMED/VIEWS:  XR CHEST PORTABLE      FINDINGS:    Cardiomegaly seen  Mild increased lung markings seen No pneumothorax or pleural effusion  Osseous structures appear within normal limits for patient age  Impression  No acute consolidation or congestion    Mild increased lung markings likely due to hypoinflation        Results for orders placed during the hospital encounter of 11/15/22    CT abdomen pelvis wo contrast    Narrative  CT ABDOMEN AND PELVIS WITHOUT IV CONTRAST    INDICATION:   Abdominal pain, acute, nonlocalized  Abd pain and vomiting  COMPARISON:  None  TECHNIQUE:  CT examination of the abdomen and pelvis was performed without intravenous contrast  Axial, sagittal, and coronal 2D reformatted images were created from the source data and submitted for interpretation  Radiation dose length product (DLP) for this visit:  662 mGy-cm     This examination, like all CT scans performed in the [facility], was performed utilizing techniques to minimize radiation dose exposure, including the use of iterative  reconstruction and automated exposure control  Enteric contrast was administered  FINDINGS:    ABDOMEN    LOWER CHEST:  No clinically significant abnormality identified in the visualized lower chest     LIVER/BILIARY TREE:  Unremarkable  GALLBLADDER:  No calcified gallstones  No pericholecystic inflammatory change  SPLEEN:  The spleen is enlarged, measuring  16 2 cm    PANCREAS:  Unremarkable  ADRENAL GLANDS:  Unremarkable  KIDNEYS/URETERS:  Unremarkable  No hydronephrosis  STOMACH AND BOWEL:  There is colonic diverticulosis without evidence of acute diverticulitis  APPENDIX:  No findings to suggest appendicitis  ABDOMINOPELVIC CAVITY:  Mild ascites  No pneumoperitoneum  No lymphadenopathy  VESSELS:  Extensive vessel calcification  PELVIS    REPRODUCTIVE ORGANS:  Unremarkable for patient's age  URINARY BLADDER:  Unremarkable  ABDOMINAL WALL/INGUINAL REGIONS:  Unremarkable  OSSEOUS STRUCTURES:  No acute fracture or destructive osseous lesion  Impression  No evidence of acute intra-abdominal pelvic pathology  Mild ascites  Splenomegaly  Workstation performed: [id number]    No results found for this or any previous visit  PLAN / RECOMMENDATIONS      64-year-old male with a past medical history of hypertension, gastroparesis, type 2 diabetes, ESRD on HD, CHF, and was admitted to [facility] [place] after presenting with abdominal pain, nausea, vomiting, and cough over the past several weeks  Nephrology was consulted for management of ESRD on HD     ESRD on HD ([address]):  Currently undergoes regular outpatient hemodialysis at [facility] on a Tuesday, Thursday, Saturday schedule  Completed a full treatment of hemodialysis yesterday, left slightly above target weight 83 4 kg (target weight currently set at 82 kg)    Chest x-ray with no evidence of volume overload, patient denies shortness of breath  No acidosis or hyperkalemia noted on labs  No urgent indication for additional hemodialysis treatment today  Patient is okay for hospital discharge from a Nephrology standpoint  Recommend he report to [facility] for his regularly scheduled dialysis treatment tomorrow  Hypertension:  Admitted with hypertensive urgency, patient reports due to being unable to take his medications due to the vomiting  Blood pressure readings within the acceptable range following administration of hydralazine and resumption of home blood pressure medication regimen  Current antihypertensive regimen includes isosorbide, labetalol, losartan, minoxidil, and nifedipine  Patient will bring his home blood pressure medications to the [facility] unit for verification  Gastroparesis:  Patient reports that he has chronic gastroparesis and has generalized abdominal pain, bloating, nausea, vomiting  He recently relocated from [place] and needs to be established with a GI doctor up here for follow-up  Referral placed by internal medicine doctor  Currently tolerating clear liquids and denies nausea/vomiting  Thank you for the consultation to participate in patient's care  I have discussed this plan with my attending physician as well as the [facility] provider  Patient is okay for hospital discharge from a Nephrology standpoint  ARIEL Lisa    11/16/2022      Portions of the record may have been created with voice recognition software  Occasional wrong word or "sound a like" substitutions may have occurred due to the inherent limitations of voice recognition software  Read the chart carefully and recognize, using context, where substitutions have occurred

## 2022-11-16 NOTE — ASSESSMENT & PLAN NOTE
· Troponin 46; 2H:delta 52  · EKG NSR HR 89  · Denies chest pain  · Suspected to the be in the setting of hypertensive urgency and volume retention as above  · Trend remainder of troponin/EKG   · Continue home daily Statin and BB

## 2022-11-16 NOTE — UTILIZATION REVIEW
NOTIFICATION OF INPATIENT ADMISSION   AUTHORIZATION REQUEST   SERVICING FACILITY:   [address] [place] [address]  Tax ID: [id number]  NPI: [id number] ATTENDING PROVIDER:  Attending Name and NPI#: Xander [id number] [[id number]  Address: [address] [place] [address]  Phone: [phone]     ADMISSION INFORMATION:  [facility]  Place of Service Code: 21  Inpatient Admission Date/Time: 11/15/22  9:28 PM  Discharge Date/Time: 11/16/2022 11:25 AM  Admitting Diagnosis Code/Description:  Hypertension [I10]     UTILIZATION REVIEW CONTACT:  Zachary Myers Utilization   Network Utilization Review Department  Phone: [phone]  Fax [phone]  Email: [email or web address]  Contact for approvals/pending authorizations, clinical reviews, and discharge  PHYSICIAN ADVISORY SERVICES:  Medical Necessity Denial & Avrz-jg-Sdmi Review  Phone: [phone]  Fax: [phone]  Email: [email or web address]

## 2022-11-16 NOTE — ASSESSMENT & PLAN NOTE
Patient reports persistent cough for the past several days  Over this timeframe he's also developed generalized abd pain and N/V  Currently reports feeling nauseated and is experiencing generalized abd pain  Denies other symptom/complaint at this time       /96 (BP Location: Right arm)   Pulse 90   Temp 97 8 °F (36 6 °C) (Tympanic)   Resp (!) 28   Ht 5' 8" (1 727 m)   Wt 81 6 kg (180 lb)   SpO2 96%   BMI 27 37 kg/m²       · BP as high as 237/117 on ED presentation  · No new neurologic findings on exam  · Currently 159/78 after 20mg IV hydralzine in ED   · Patient reports missing home BP meds due to N/V at home   · Continue home lasix, labetolol, minoxidil, nifedipine, valsartan, and clonidine patch   · Per nephrology, stable for discharge on home meds

## 2022-11-16 NOTE — ASSESSMENT & PLAN NOTE
· Reporting generalized abd pain and bloating  · Associated N/V (nonbloody non-bilious)  · Has likely diabetic gastroparesis   · New to the area, relocated from [place]  · Referred to GI for outpatient follow up  · Prescribed bentyl and reglan  · Tolerating diet  · Stable for discharge

## 2022-11-16 NOTE — ASSESSMENT & PLAN NOTE
· Troponin 46; 2H:delta 52  · EKG NSR HR 89  · Denies chest pain  · Suspected to the be in the setting of hypertensive urgency and volume retention as above  · Now stable for discharge

## 2022-11-16 NOTE — ASSESSMENT & PLAN NOTE
Wt Readings from Last 3 Encounters:   11/15/22 81 6 kg (180 lb)     · Physical exam and CXR suggestive of some mild volume retention  · Not hypoxic on RA  · Continue home lasix 80mg BID  · Euvolemic on discharge

## 2022-11-16 NOTE — ASSESSMENT & PLAN NOTE
· Reporting cough "for a while"   · COVID negative   · Possibly in setting of volume retention  · Tessalon traci ordered

## 2022-11-16 NOTE — ASSESSMENT & PLAN NOTE
Patient reports persistent cough for the past several days  Over this timeframe he's also developed generalized abd pain and N/V  Currently reports feeling nauseated and is experiencing generalized abd pain  Denies other symptom/complaint at this time       /78 (BP Location: Left arm)   Pulse 90   Temp 97 8 °F (36 6 °C) (Tympanic)   Resp 19   Ht 5' 8" (1 727 m)   Wt 81 6 kg (180 lb)   SpO2 98%   BMI 27 37 kg/m²       · BP as high as 237/117 on ED presentation  · No new neurologic findings on exam  · Currently 159/78 after 20mg IV hydralzine in ED   · Patient reports missing home BP meds due to N/V at home   · Continue home lasix, labetolol, minoxidil, nifedipine, valsartan, and clonidine patch   · PRN IV antihypertensives as appropriate  · Monitor BP closely overnight

## 2022-11-17 ENCOUNTER — TELEPHONE (OUTPATIENT)
Dept: GASTROENTEROLOGY | Facility: CLINIC | Age: 47
End: 2022-11-17

## 2022-11-17 LAB
ATRIAL RATE: 89 BPM
P AXIS: 61 DEGREES
PR INTERVAL: 162 MS
QRS AXIS: 79 DEGREES
QRSD INTERVAL: 88 MS
QT INTERVAL: 406 MS
QTC INTERVAL: 493 MS
T WAVE AXIS: 70 DEGREES
VENTRICULAR RATE: 89 BPM

## 2022-11-17 NOTE — UTILIZATION REVIEW
Chino Cartagena MD  Physician  Hospitalist  Discharge Summary      Signed  Date of Service:  11/16/2022 10:01 AM     Signed        [facility]  Discharge- Scar Guy 1975, 50 y o  male MRN: [id number]  Unit/Bed#: ED 25 Encounter: [id number]  Primary Care Provider: No primary care provider on file  Date and time admitted to hospital: 11/15/2022  7:11 PM     * Hypertensive urgency  Assessment & Plan  Patient reports persistent cough for the past several days  Over this timeframe he's also developed generalized abd pain and N/V  Currently reports feeling nauseated and is experiencing generalized abd pain   Denies other symptom/complaint at this time       /96 (BP Location: Right arm)   Pulse 90   Temp 97 8 °F (36 6 °C) (Tympanic)   Resp (!) 28   Ht 5' 8" (1 727 m)   Wt 81 6 kg (180 lb)   SpO2 96%   BMI 27 37 kg/m²         • BP as high as 237/117 on ED presentation  • No new neurologic findings on exam  • Currently 159/78 after 20mg IV hydralzine in ED   • Patient reports missing home BP meds due to N/V at home   • Continue home lasix, labetolol, minoxidil, nifedipine, valsartan, and clonidine patch   • Per nephrology, stable for discharge on home meds     Gastroparesis  Assessment & Plan  • Reporting generalized abd pain and bloating  • Associated N/V (nonbloody non-bilious)  • Has likely diabetic gastroparesis   • New to the area, relocated from [place]  • Referred to GI for outpatient follow up  • Prescribed bentyl and reglan  • Tolerating diet  • Stable for discharge      Cough  Assessment & Plan  • Reporting cough "for a while"   • COVID negative   • Possibly in setting of volume retention  • Tessalon pearles ordered      Elevated troponin  Assessment & Plan  • Troponin 46; 2H:delta 52  • EKG NSR HR 89  • Denies chest pain  • Suspected to the be in the setting of hypertensive urgency and volume retention as above  • Now stable for discharge      T2DM (type 2 diabetes mellitus) [facility])  Assessment & Plan        Lab Results   Component Value Date     HGBA1C 5 2 07/25/2021      • Controlled with diet and exercise   • Continue on discharge         CHF (congestive heart failure) ([facility])  Assessment & Plan      Wt Readings from Last 3 Encounters:   11/15/22 81 6 kg (180 lb)      • Physical exam and CXR suggestive of some mild volume retention  • Not hypoxic on RA  • Continue home lasix 80mg BID  • Euvolemic on discharge           ESRD (end stage renal disease) [facility])  Assessment & Plan        Lab Results   Component Value Date     EGFR 7 11/16/2022     EGFR 9 11/15/2022     CREATININE 7 49 (H) 11/16/2022     CREATININE 6 45 (H) 11/15/2022      • Receives dialysis every T, TH, Sat  • Reports compliance with dialysis; last received today (11/15)  • Nephrology consulted for dialysis and volume assistance   • At this time cleared for discharge, recommendation to continue home meds  • Referral to [facility] in discharge packet               Medical Problems      Resolved Problems  Date Reviewed: 11/15/2022   None         Discharging Physician / Practitioner: Yadiel Mckeon  PCP: No primary care provider on file  Admission Date:       Admission Orders (From admission, onward)       Ordered         11/15/22 2128   INPATIENT ADMISSION  Once                         Discharge Date: 11/16/22     Consultations During Hospital Stay:  • IP CONSULT TO NEPHROLOGY  •       Procedures Performed:   • imaging     Significant Findings / Test Results:   • Principal Problem:  •   Hypertensive urgency  • Active Problems:  •   Gastroparesis  •   ESRD (end stage renal disease) ([facility])  •   CHF (congestive heart failure) ([facility])  •   T2DM (type 2 diabetes mellitus) ([facility])  •   Elevated troponin  •   Cough  • Resolved Problems:  •   * No resolved hospital problems  *  •    •       Incidental Findings:   • See above       Test Results Pending at Discharge (will require follow up):    • Na Outpatient Tests Requested:  • Follow up with GI  • Follow up with PCP- referred to [facility]  • Follow up with Nephrology     Complications:  none     Reason for Admission: hypertensive urgency with nausea and vomiting     Hospital Course:   Abida Hewitt is a 50 y o  male patient who originally presented to the hospital on 11/15/2022 due to hypertensive urgency in setting of intractable nausea and vomiting secondary to gastroparesis which has now resolved overnight  Stable for discharge with recommendation to follow up with GI, nephrology, and primary care  All referrals in discharge packet       Condition at Discharge: good     Discharge Day Visit / Exam:   * Please refer to separate progress note for these details *        Discharge instructions/Information to patient and family:   See after visit summary for information provided to patient and family        Provisions for Follow-Up Care:  See after visit summary for information related to follow-up care and any pertinent home health orders  Disposition:   Home     Planned Readmission: none      Discharge Statement:  I spent 30+ minutes discharging the patient  This time was spent on the day of discharge  I had direct contact with the patient on the day of discharge  Greater than 50% of the total time was spent examining patient, answering all patient questions, arranging and discussing plan of care with patient as well as directly providing post-discharge instructions    Additional time then spent on discharge activities      Discharge Medications:  See after visit summary for reconciled discharge medications provided to patient and/or family        **Please Note: This note may have been constructed using a voice recognition system**

## 2022-11-18 NOTE — ED PROVIDER NOTES
History  Chief Complaint   Patient presents with   • High Blood Pressure     Pt was sent by dialysis d/t increased blood pressure, pt states he has been nauseous and coughing x 3 weeks      53-year-old male presenting to emergency department for evaluation of high blood pressure  Patient is on dialysis, presented for dialysis today and had his blood pressure taken, it was noted to be above 492 systolic and so he was therefore referred to the emergency department  He has no chest pain he has no palpitations or shortness of breath, he does have bilateral leg swelling  He reports no orthopnea or dyspnea on exertion  He reports no headache visual changes nausea or vomiting  Prior to Admission Medications   Prescriptions Last Dose Informant Patient Reported? Taking?    NIFEdipine ER (ADALAT CC) 60 MG 24 hr tablet   Yes Yes   Sig: Take 60 mg by mouth 2 (two) times a day   NIFEdipine ER (ADALAT CC) 60 MG 24 hr tablet   No Yes   Sig: Take 1 tablet (60 mg total) by mouth 2 (two) times a day   atorvastatin (LIPITOR) 80 mg tablet   Yes Yes   Sig: Take 80 mg by mouth daily   atorvastatin (LIPITOR) 80 mg tablet   No Yes   Sig: Take 1 tablet (80 mg total) by mouth daily   calcium acetate (CALPHRON) 667 mg   Yes Yes   Sig: Take by mouth 2 (two) times a day as needed (With snacks)   cloNIDine (CATAPRES-TTS-3) 0 3 mg/24 hr   Yes Yes   Sig: Place 1 patch on the skin once a week   cloNIDine (CATAPRES-TTS-3) 0 3 mg/24 hr   No Yes   Sig: Place 1 patch (0 3 mg total) on the skin once a week   dorzolamide-timolol (COSOPT) 22 3-6 8 MG/ML ophthalmic solution   Yes Yes   Sig: Administer 1 drop to both eyes 2 (two) times a day   furosemide (LASIX) 80 mg tablet   Yes Yes   Sig: Take 80 mg by mouth 2 (two) times a day   furosemide (LASIX) 80 mg tablet   No Yes   Sig: Take 1 tablet (80 mg total) by mouth 2 (two) times a day   isosorbide mononitrate (IMDUR) 30 mg 24 hr tablet   Yes Yes   Sig: Take 30 mg by mouth daily   isosorbide mononitrate (IMDUR) 30 mg 24 hr tablet   No Yes   Sig: Take 1 tablet (30 mg total) by mouth daily   labetalol (NORMODYNE) 200 mg tablet   Yes Yes   Sig: Take 600 mg by mouth 2 (two) times a day   labetalol (NORMODYNE) 200 mg tablet   No Yes   Sig: Take 3 tablets (600 mg total) by mouth 2 (two) times a day   minoxidil (LONITEN) 2 5 mg tablet   Yes Yes   Sig: Take 2 5 mg by mouth 2 (two) times a day   minoxidil (LONITEN) 2 5 mg tablet   No Yes   Sig: Take 1 tablet (2 5 mg total) by mouth 2 (two) times a day   valsartan (DIOVAN) 320 MG tablet   Yes Yes   Sig: Take 320 mg by mouth daily   valsartan (DIOVAN) 320 MG tablet   No Yes   Sig: Take 1 tablet (320 mg total) by mouth daily      Facility-Administered Medications: None       Past Medical History:   Diagnosis Date   • Hypertension    • Renal disorder        History reviewed  No pertinent surgical history  History reviewed  No pertinent family history  I have reviewed and agree with the history as documented  E-Cigarette/Vaping   • E-Cigarette Use Never User      E-Cigarette/Vaping Substances     Social History     Tobacco Use   • Smoking status: Never   • Smokeless tobacco: Never   Vaping Use   • Vaping Use: Never used       Review of Systems   Constitutional: Negative for appetite change, chills, fatigue and fever  HENT: Negative for sneezing and sore throat  Eyes: Negative for visual disturbance  Respiratory: Negative for cough, choking, chest tightness, shortness of breath and wheezing  Cardiovascular: Positive for leg swelling  Negative for chest pain and palpitations  Gastrointestinal: Negative for abdominal pain, constipation, diarrhea, nausea and vomiting  Genitourinary: Negative for difficulty urinating and dysuria  Neurological: Negative for dizziness, weakness, light-headedness, numbness and headaches  All other systems reviewed and are negative  Physical Exam  Physical Exam  Vitals and nursing note reviewed     Constitutional: General: He is not in acute distress  Appearance: He is well-developed and well-nourished  He is not diaphoretic  HENT:      Head: Normocephalic and atraumatic  Mouth/Throat:      Mouth: Oropharynx is clear and moist    Eyes:      Extraocular Movements: EOM normal       Pupils: Pupils are equal, round, and reactive to light  Neck:      Vascular: No JVD  Trachea: No tracheal deviation  Cardiovascular:      Rate and Rhythm: Normal rate and regular rhythm  Pulses: Intact distal pulses  Heart sounds: Normal heart sounds  No murmur heard  No friction rub  No gallop  Pulmonary:      Effort: Pulmonary effort is normal  No respiratory distress  Breath sounds: Normal breath sounds  No wheezing or rales  Abdominal:      General: Bowel sounds are normal  There is no distension  Palpations: Abdomen is soft  Tenderness: There is no abdominal tenderness  There is no guarding or rebound  Skin:     General: Skin is warm and dry  Coloration: Skin is not pale  Neurological:      Mental Status: He is alert and oriented to person, place, and time  Cranial Nerves: No cranial nerve deficit  Motor: No abnormal muscle tone     Psychiatric:         Mood and Affect: Mood and affect normal          Behavior: Behavior normal          Vital Signs  ED Triage Vitals   Temperature Pulse Respirations Blood Pressure SpO2   11/15/22 1706 11/15/22 1706 11/15/22 1706 11/15/22 1706 11/15/22 1706   97 8 °F (36 6 °C) 93 20 (!) 237/117 96 %      Temp Source Heart Rate Source Patient Position - Orthostatic VS BP Location FiO2 (%)   11/15/22 1706 11/15/22 2028 11/15/22 1706 11/15/22 1706 --   Tympanic Monitor Sitting Left arm       Pain Score       --                  Vitals:    11/16/22 0600 11/16/22 0700 11/16/22 0800 11/16/22 0900   BP: 155/70 162/76 167/83 162/96   Pulse: 80 80 80 90   Patient Position - Orthostatic VS: Lying Lying Lying Lying         Visual Acuity      ED Medications  Medications   hydrALAZINE (APRESOLINE) injection 20 mg (20 mg Intravenous Given 11/15/22 2005)   albuterol inhalation solution 5 mg (5 mg Nebulization Given 11/15/22 2005)   ondansetron (ZOFRAN) injection 4 mg (4 mg Intravenous Given 11/15/22 2028)   metoclopramide (REGLAN) injection 5 mg (5 mg Intravenous Given 11/15/22 2230)     And   diphenhydrAMINE (BENADRYL) injection 25 mg (25 mg Intravenous Given 11/15/22 2228)   HYDROmorphone HCl (DILAUDID) injection 0 2 mg (0 2 mg Intravenous Given 11/15/22 2229)       Diagnostic Studies  Results Reviewed     Procedure Component Value Units Date/Time    CBC and differential [[id number]]  (Abnormal) Collected: 11/16/22 0537    Lab Status: Final result Specimen: Blood from Arm, Right Updated: 11/16/22 0729     WBC 4 97 Thousand/uL      RBC 3 77 Million/uL      Hemoglobin 11 6 g/dL      Hematocrit 36 3 %      MCV 96 fL      MCH 30 8 pg      MCHC 32 0 g/dL      RDW 13 9 %      MPV 10 7 fL      Platelets 675 Thousands/uL      nRBC 0 /100 WBCs      Neutrophils Relative 70 %      Immat GRANS % 0 %      Lymphocytes Relative 14 %      Monocytes Relative 11 %      Eosinophils Relative 4 %      Basophils Relative 1 %      Neutrophils Absolute 3 46 Thousands/µL      Immature Grans Absolute 0 01 Thousand/uL      Lymphocytes Absolute 0 69 Thousands/µL      Monocytes Absolute 0 55 Thousand/µL      Eosinophils Absolute 0 21 Thousand/µL      Basophils Absolute 0 05 Thousands/µL     Basic metabolic panel [[id number]]  (Abnormal) Collected: 11/16/22 0537    Lab Status: Final result Specimen: Blood from Arm, Right Updated: 11/16/22 5270     Sodium 141 mmol/L      Potassium 4 9 mmol/L      Chloride 97 mmol/L      CO2 31 mmol/L      ANION GAP 13 mmol/L      BUN 46 mg/dL      Creatinine 7 49 mg/dL      Glucose 71 mg/dL      Calcium 8 8 mg/dL      eGFR 7 ml/min/1 73sq m     Narrative:      [place] guidelines for Chronic Kidney Disease (CKD):   •  Stage 1 with normal or high GFR (GFR > 90 mL/min/1 73 square meters)  •  Stage 2 Mild CKD (GFR = 60-89 mL/min/1 73 square meters)  •  Stage 3A Moderate CKD (GFR = 45-59 mL/min/1 73 square meters)  •  Stage 3B Moderate CKD (GFR = 30-44 mL/min/1 73 square meters)  •  Stage 4 Severe CKD (GFR = 15-29 mL/min/1 73 square meters)  •  Stage 5 End Stage CKD (GFR <15 mL/min/1 73 square meters)  Note: GFR calculation is accurate only with a steady state creatinine    HS Troponin I 4hr [[id number]]  (Normal) Collected: 11/15/22 2101    Lab Status: Final result Specimen: Blood from Arm, Right Updated: 11/15/22 2130     hs TnI 4hr 49 ng/L      Delta 4hr hsTnI 3 ng/L     FLU/RSV/COVID - if FLU/RSV clinically relevant [[id number]]  (Normal) Collected: 11/15/22 2006    Lab Status: Final result Specimen: Nares from Nose Updated: 11/15/22 2058     SARS-CoV-2 Negative     INFLUENZA A PCR Negative     INFLUENZA B PCR Negative     RSV PCR Negative    Narrative:      FOR PEDIATRIC PATIENTS - copy/paste COVID Guidelines URL to browser: [email or web address]  [employer]    SARS-CoV-2 assay is a Nucleic Acid Amplification assay intended for the  qualitative detection of nucleic acid from SARS-CoV-2 in nasopharyngeal  swabs  Results are for the presumptive identification of SARS-CoV-2 RNA  Positive results are indicative of infection with SARS-CoV-2, the virus  causing COVID-19, but do not rule out bacterial infection or co-infection  with other viruses  Laboratories within the [place] and its  territories are required to report all positive results to the appropriate  public health authorities  Negative results do not preclude SARS-CoV-2  infection and should not be used as the sole basis for treatment or other  patient management decisions  Negative results must be combined with  clinical observations, patient history, and epidemiological information    This test has not been FDA cleared or approved  This test has been authorized by FDA under an Emergency Use Authorization  (EUA)  This test is only authorized for the duration of time the  declaration that circumstances exist justifying the authorization of the  emergency use of an in vitro diagnostic tests for detection of SARS-CoV-2  virus and/or diagnosis of COVID-19 infection under section 564(b)(1) of  the Act, 21 U  S C  458NXJ-7(E)(0), unless the authorization is terminated  or revoked sooner  The test has been validated but independent review by FDA  and CLIA is pending  Test performed using [employer] Gene[employer]pert: This RT-PCR assay targets N2,  a region unique to SARS-CoV-2  A conserved region in the E-gene was chosen  for pan-Sarbecovirus detection which includes SARS-CoV-2  According to CMS-2020-01-R, this platform meets the definition of high-throughput technology      HS Troponin I 2hr [[id number]]  (Abnormal) Collected: 11/15/22 2006    Lab Status: Final result Specimen: Blood from Hand, Right Updated: 11/15/22 2040     hs TnI 2hr 52 ng/L      Delta 2hr hsTnI 6 ng/L     Comprehensive metabolic panel [[id number]]  (Abnormal) Collected: 11/15/22 1714    Lab Status: Final result Specimen: Blood from Arm, Right Updated: 11/15/22 1813     Sodium 136 mmol/L      Potassium 3 8 mmol/L      Chloride 93 mmol/L      CO2 31 mmol/L      ANION GAP 12 mmol/L      BUN 38 mg/dL      Creatinine 6 45 mg/dL      Glucose 90 mg/dL      Calcium 9 2 mg/dL      AST 14 U/L      ALT 11 U/L      Alkaline Phosphatase 64 U/L      Total Protein 7 5 g/dL      Albumin 4 4 g/dL      Total Bilirubin 0 92 mg/dL      eGFR 9 ml/min/1 73sq m     Narrative:      [place] guidelines for Chronic Kidney Disease (CKD):   •  Stage 1 with normal or high GFR (GFR > 90 mL/min/1 73 square meters)  •  Stage 2 Mild CKD (GFR = 60-89 mL/min/1 73 square meters)  •  Stage 3A Moderate CKD (GFR = 45-59 mL/min/1 73 square meters)  •  Stage 3B Moderate CKD (GFR = 30-44 mL/min/1 73 square meters)  •  Stage 4 Severe CKD (GFR = 15-29 mL/min/1 73 square meters)  •  Stage 5 End Stage CKD (GFR <15 mL/min/1 73 square meters)  Note: GFR calculation is accurate only with a steady state creatinine    HS Troponin 0hr (reflex protocol) [[id number]]  (Normal) Collected: 11/15/22 1714    Lab Status: Final result Specimen: Blood from Arm, Right Updated: 11/15/22 1810     hs TnI 0hr 46 ng/L     CBC and differential [[id number]]  (Abnormal) Collected: 11/15/22 1714    Lab Status: Final result Specimen: Blood from Arm, Right Updated: 11/15/22 1748     WBC 4 50 Thousand/uL      RBC 3 89 Million/uL      Hemoglobin 11 9 g/dL      Hematocrit 37 6 %      MCV 97 fL      MCH 30 6 pg      MCHC 31 6 g/dL      RDW 13 8 %      MPV 11 1 fL      Platelets 532 Thousands/uL      nRBC 0 /100 WBCs      Neutrophils Relative 74 %      Immat GRANS % 0 %      Lymphocytes Relative 11 %      Monocytes Relative 8 %      Eosinophils Relative 6 %      Basophils Relative 1 %      Neutrophils Absolute 3 31 Thousands/µL      Immature Grans Absolute 0 01 Thousand/uL      Lymphocytes Absolute 0 48 Thousands/µL      Monocytes Absolute 0 38 Thousand/µL      Eosinophils Absolute 0 27 Thousand/µL      Basophils Absolute 0 05 Thousands/µL                  CT abdomen pelvis wo contrast   Final Result by Orly Niño MD (11/15 2285)      No evidence of acute intra-abdominal pelvic pathology  Mild ascites  Splenomegaly              Workstation performed: [id number]         XR chest 1 view portable   Final Result by Keiko Mcgarry MD (97/93 1929)      No acute consolidation or congestion      Mild increased lung markings likely due to hypoinflation            Workstation performed: [id number]                    Procedures  Procedures         ED Course                                             MDM  Number of Diagnoses or Management Options  Elevated troponin  Hypertension  Diagnosis management comments: 47-year-old male with high blood pressure, suspect volume overload due to need for dialysis, will check labs EKG troponin reassess  Patient is involving troponin here, this may be due to poor renal clearance in the setting chronic kidney disease on hemodialysis, though direct myocardial injury due to accelerated hypertension cannot be excluded, discussed case with Nephrology and with medicine, will give IV antihypertensives and admit for inpatient dialysis and cardiac monitoring  Disposition  Final diagnoses:   Hypertension   Elevated troponin     Time reflects when diagnosis was documented in both MDM as applicable and the Disposition within this note     Time User Action Codes Description Comment    11/15/2022  9:28 PM Marlon Muñoz Add [I10] Hypertension     11/15/2022  9:28 PM Marlon Muñoz Add [R77 8] Elevated troponin     11/15/2022  9:44 PM Ruperto Cerrillos Add [N18 6] ESR[facility] stage renal [facility]     11/16/2022  9:51 AM Cy Shackle [I16 0] Hypertensive urgency     11/16/2022  9:51 AM Cindi Shena Add [R10 9] Abdominal pain, unspecified abdominal location       ED Disposition     ED Disposition   Admit    Condition   Stable    Date/Time   Tue Nov 15, 2022  9:28 PM    Comment   Case was discussed with [facility] and the patient's admission status was agreed to be Admission Status: inpatient status to the service of Dr Esther Freeman              Follow-up Information    None         Discharge Medication List as of 11/16/2022 10:41 AM      START taking these medications    Details   dicyclomine (BENTYL) 10 mg capsule Take 1 capsule (10 mg total) by mouth 4 (four) times a day (before meals and at bedtime), Starting Wed 11/16/2022, Until Sun 1/15/2023, Normal      metoclopramide (Reglan) 10 mg tablet Take 1 tablet (10 mg total) by mouth 4 (four) times a day, Starting Wed 11/16/2022, Until Sun 1/15/2023, Normal         CONTINUE these medications which have CHANGED    Details   atorvastatin (LIPITOR) 80 mg tablet Take 1 tablet (80 mg total) by mouth daily, Starting Wed 11/16/2022, Normal      cloNIDine (CATAPRES-TTS-3) 0 3 mg/24 hr Place 1 patch (0 3 mg total) on the skin once a week, Starting Wed 11/16/2022, Until Sun 1/15/2023, Normal      furosemide (LASIX) 80 mg tablet Take 1 tablet (80 mg total) by mouth 2 (two) times a day, Starting Wed 11/16/2022, Until Sun 1/15/2023, Normal      isosorbide mononitrate (IMDUR) 30 mg 24 hr tablet Take 1 tablet (30 mg total) by mouth daily, Starting Wed 11/16/2022, Until Sun 1/15/2023, Normal      labetalol (NORMODYNE) 200 mg tablet Take 3 tablets (600 mg total) by mouth 2 (two) times a day, Starting Wed 11/16/2022, Until Sun 1/15/2023, Normal      minoxidil (LONITEN) 2 5 mg tablet Take 1 tablet (2 5 mg total) by mouth 2 (two) times a day, Starting Wed 11/16/2022, Until Sun 1/15/2023, Normal      NIFEdipine ER (ADALAT CC) 60 MG 24 hr tablet Take 1 tablet (60 mg total) by mouth 2 (two) times a day, Starting Wed 11/16/2022, Until Sun 1/15/2023, Normal      valsartan (DIOVAN) 320 MG tablet Take 1 tablet (320 mg total) by mouth daily, Starting Wed 11/16/2022, Until Sun 1/15/2023, Normal         CONTINUE these medications which have NOT CHANGED    Details   calcium acetate (CALPHRON) 667 mg Take by mouth 2 (two) times a day as needed (With snacks), Historical Med      dorzolamide-timolol (COSOPT) 22 3-6 8 MG/ML ophthalmic solution Administer 1 drop to both eyes 2 (two) times a day, Historical Med                 PDMP Review     None          ED Provider  Electronically Signed by           Simran Mcintosh MD  11/18/22 9358

## 2022-11-21 NOTE — UTILIZATION REVIEW
Jonathan Giles MD  Physician  Hospitalist  Discharge Summary      Signed  Date of Service:  11/16/2022 10:01 AM     Signed        [address] [facility]  Discharge- Brian Velarde 1975, 50 y o  male MRN: [id number]  Unit/Bed#: ED 25 Encounter: [id number]  Primary Care Provider: No primary care provider on file  Date and time admitted to hospital: 11/15/2022  7:11 PM     * Hypertensive urgency  Assessment & Plan  Patient reports persistent cough for the past several days  Over this timeframe he's also developed generalized abd pain and N/V  Currently reports feeling nauseated and is experiencing generalized abd pain   Denies other symptom/complaint at this time       /96 (BP Location: Right arm)   Pulse 90   Temp 97 8 °F (36 6 °C) (Tympanic)   Resp (!) 28   Ht 5' 8" (1 727 m)   Wt 81 6 kg (180 lb)   SpO2 96%   BMI 27 37 kg/m²         • BP as high as 237/117 on ED presentation  • No new neurologic findings on exam  • Currently 159/78 after 20mg IV hydralzine in ED   • Patient reports missing home BP meds due to N/V at home   • Continue home lasix, labetolol, minoxidil, nifedipine, valsartan, and clonidine patch   • Per nephrology, stable for discharge on home meds     Gastroparesis  Assessment & Plan  • Reporting generalized abd pain and bloating  • Associated N/V (nonbloody non-bilious)  • Has likely diabetic gastroparesis   • New to the area, relocated from [place]  • Referred to GI for outpatient follow up  • Prescribed bentyl and reglan  • Tolerating diet  • Stable for discharge      Cough  Assessment & Plan  • Reporting cough "for a while"   • COVID negative   • Possibly in setting of volume retention  • Tessalon pearles ordered      Elevated troponin  Assessment & Plan  • Troponin 46; 2H:delta 52  • EKG NSR HR 89  • Denies chest pain  • Suspected to the be in the setting of hypertensive urgency and volume retention as above  • Now stable for discharge      T2DM (type 2 diabetes mellitus) [facility])  Assessment & Plan        Lab Results   Component Value Date     HGBA1C 5 2 07/25/2021      • Controlled with diet and exercise   • Continue on discharge         CHF (congestive heart failure) ([facility])  Assessment & Plan      Wt Readings from Last 3 Encounters:   11/15/22 81 6 kg (180 lb)      • Physical exam and CXR suggestive of some mild volume retention  • Not hypoxic on RA  • Continue home lasix 80mg BID  • Euvolemic on discharge           ESRD (end stage renal disease) [facility])  Assessment & Plan        Lab Results   Component Value Date     EGFR 7 11/16/2022     EGFR 9 11/15/2022     CREATININE 7 49 (H) 11/16/2022     CREATININE 6 45 (H) 11/15/2022      • Receives dialysis every T, TH, Sat  • Reports compliance with dialysis; last received today (11/15)  • Nephrology consulted for dialysis and volume assistance   • At this time cleared for discharge, recommendation to continue home meds  • Referral to [facility] in discharge packet               Medical Problems      Resolved Problems  Date Reviewed: 11/15/2022   None         Discharging Physician / Practitioner: Leah Carlton  PCP: No primary care provider on file  Admission Date:       Admission Orders (From admission, onward)       Ordered         11/15/22 2128   INPATIENT ADMISSION  Once                         Discharge Date: 11/16/22     Consultations During Hospital Stay:  • IP CONSULT TO NEPHROLOGY  •       Procedures Performed:   • imaging     Significant Findings / Test Results:   • Principal Problem:  •   Hypertensive urgency  • Active Problems:  •   Gastroparesis  •   ESRD (end stage renal disease) ([facility])  •   CHF (congestive heart failure) ([facility])  •   T2DM (type 2 diabetes mellitus) ([facility])  •   Elevated troponin  •   Cough  • Resolved Problems:  •   * No resolved hospital problems  *  •    •       Incidental Findings:   • See above       Test Results Pending at Discharge (will require follow up):    • Na Outpatient Tests Requested:  • Follow up with GI  • Follow up with PCP- referred to [facility]  • Follow up with Nephrology     Complications:  none     Reason for Admission: hypertensive urgency with nausea and vomiting     Hospital Course:   Gerald Mendez is a 50 y o  male patient who originally presented to the hospital on 11/15/2022 due to hypertensive urgency in setting of intractable nausea and vomiting secondary to gastroparesis which has now resolved overnight  Stable for discharge with recommendation to follow up with GI, nephrology, and primary care  All referrals in discharge packet       Condition at Discharge: good     Discharge Day Visit / Exam:   * Please refer to separate progress note for these details *        Discharge instructions/Information to patient and family:   See after visit summary for information provided to patient and family        Provisions for Follow-Up Care:  See after visit summary for information related to follow-up care and any pertinent home health orders  Disposition:   Home     Planned Readmission: none      Discharge Statement:  I spent 30+ minutes discharging the patient  This time was spent on the day of discharge  I had direct contact with the patient on the day of discharge  Greater than 50% of the total time was spent examining patient, answering all patient questions, arranging and discussing plan of care with patient as well as directly providing post-discharge instructions    Additional time then spent on discharge activities      Discharge Medications:  See after visit summary for reconciled discharge medications provided to patient and/or family        **Please Note: This note may have been constructed using a voice recognition system**

## 2022-11-28 RX ORDER — ATORVASTATIN CALCIUM 80 MG/1
80 TABLET, FILM COATED ORAL DAILY
COMMUNITY
Start: 2022-06-21

## 2022-11-28 RX ORDER — INSULIN GLARGINE 100 [IU]/ML
10 INJECTION, SOLUTION SUBCUTANEOUS DAILY
COMMUNITY

## 2022-11-28 RX ORDER — ERGOCALCIFEROL (VITAMIN D2) 1250 MCG
50000 CAPSULE ORAL
COMMUNITY

## 2022-11-28 RX ORDER — MINOXIDIL 2.5 MG/1
2.5 TABLET ORAL 2 TIMES DAILY
COMMUNITY

## 2022-11-28 RX ORDER — NIFEDIPINE 60 MG/1
60 TABLET, FILM COATED, EXTENDED RELEASE ORAL 2 TIMES DAILY
COMMUNITY

## 2022-11-28 RX ORDER — LABETALOL 200 MG/1
200 TABLET, FILM COATED ORAL 2 TIMES DAILY
COMMUNITY

## 2022-11-30 ENCOUNTER — OFFICE VISIT (OUTPATIENT)
Dept: GASTROENTEROLOGY | Facility: CLINIC | Age: 47
End: 2022-11-30

## 2022-11-30 VITALS
HEIGHT: 68 IN | WEIGHT: 197.6 LBS | OXYGEN SATURATION: 92 % | SYSTOLIC BLOOD PRESSURE: 90 MMHG | DIASTOLIC BLOOD PRESSURE: 60 MMHG | BODY MASS INDEX: 29.95 KG/M2 | HEART RATE: 87 BPM

## 2022-11-30 DIAGNOSIS — K21.9 GASTROESOPHAGEAL REFLUX DISEASE WITHOUT ESOPHAGITIS: Primary | ICD-10-CM

## 2022-11-30 DIAGNOSIS — R10.9 ABDOMINAL PAIN, UNSPECIFIED ABDOMINAL LOCATION: ICD-10-CM

## 2022-11-30 DIAGNOSIS — K31.84 GASTROPARESIS: ICD-10-CM

## 2022-11-30 DIAGNOSIS — Z76.89 ENCOUNTER TO ESTABLISH CARE WITH NEW DOCTOR: ICD-10-CM

## 2022-11-30 RX ORDER — PANTOPRAZOLE SODIUM 40 MG/1
40 TABLET, DELAYED RELEASE ORAL 2 TIMES DAILY
Qty: 60 TABLET | Refills: 3 | Status: SHIPPED | OUTPATIENT
Start: 2022-11-30

## 2022-11-30 RX ORDER — ONDANSETRON 4 MG/1
4 TABLET, ORALLY DISINTEGRATING ORAL EVERY 6 HOURS PRN
COMMUNITY

## 2022-11-30 NOTE — LETTER
November 30, 2022     Dalia Muñoz MD  [address] [facility]  [facility] [address]    Patient: Jaret Rosario   YOB: 1975   Date of Visit: 11/30/2022       Dear Dr Mayte Hugo: Thank you for referring Jaret Rosario to me for evaluation  Below are my notes for this consultation  If you have questions, please do not hesitate to call me  I look forward to following your patient along with you  Sincerely,        Lucinda Quinteros PA-C        CC: No Recipients  Isabela Harrison  11/30/2022  9:34 AM  Sign when Signing Visit  [facility] - Outpatient Consultation  Jaret Rosario 50 y o  male MRN: [id number]  Encounter: [id number]          ASSESSMENT AND PLAN:      1  Abdominal pain, unspecified abdominal location  2  Gastroesophageal reflux disease without esophagitis  3  Encounter to establish care with new doctor  4  Gastroparesis  -Will plan gastric emptying study   -Will start pantoprazole 40 mg b i d  -Will hold off on starting Reglan therapy until after gastric emptying study is completed  -Encourage patient to be eating multiple small meals throughout the course of the day  -Follow-up in 1 month    ______________________________________________________________________    HPI:    64-year-old male with a past medical history significant for end-stage renal disease on hemodialysis, hypertension, CHF, history of diabetes mellitus, history of gastroparesis presents to the GI clinic today for consultation  Patient reports for the past several months he has been experiencing a cough with dry heaving  Patient does suffer from acid reflux disease with daily heartburn and can not regurgitation  Patient does have a remote history of gastroparesis that he is not actively being treated for  He reports that he does utilize Zofran at home as needed  He reports that his last gastric emptying study was many years ago at [facility]    He reports his last upper endoscopy was at [address]  He is unsure of these results  Patient reports he is not on PPI therapy  He reports the last time he received Reglan was while he was in the emergency department  REVIEW OF SYSTEMS:    CONSTITUTIONAL: Denies any fever, chills, rigors, and weight loss  HEENT: No earache or tinnitus  Denies hearing loss or visual disturbances  CARDIOVASCULAR: No chest pain or palpitations  RESPIRATORY: Denies any cough, hemoptysis, shortness of breath or dyspnea on exertion  GASTROINTESTINAL: As noted in the History of Present Illness  GENITOURINARY: No problems with urination  Denies any hematuria or dysuria  NEUROLOGIC: No dizziness or vertigo, denies headaches  MUSCULOSKELETAL: Denies any muscle or joint pain  SKIN: Denies skin rashes or itching  ENDOCRINE: Denies excessive thirst  Denies intolerance to heat or cold  PSYCHOSOCIAL: Denies depression or anxiety  Denies any recent memory loss  Historical Information   Past Medical History:   Diagnosis Date   • Hypertension    • Renal disorder      History reviewed  No pertinent surgical history  Social History   Social History     Substance and Sexual Activity   Alcohol Use None     Social History     Substance and Sexual Activity   Drug Use Not on file     Social History     Tobacco Use   Smoking Status Never   Smokeless Tobacco Never     History reviewed  No pertinent family history      Meds/Allergies        Current Outpatient Medications:   •  Albuterol Sulfate 108 (90 Base) MCG/ACT AEPB  •  atorvastatin (LIPITOR) 80 mg tablet  •  atorvastatin (LIPITOR) 80 mg tablet  •  calcium acetate (CALPHRON) 667 mg  •  cloNIDine (CATAPRES-TTS-3) 0 3 mg/24 hr  •  dicyclomine (BENTYL) 10 mg capsule  •  dorzolamide-timolol (COSOPT) 22 3-6 8 MG/ML ophthalmic solution  •  ergocalciferol (ERGOCALCIFEROL) 1 25 MG (33315 UT) capsule  •  furosemide (LASIX) 80 mg tablet  •  insulin glargine (LANTUS) 100 units/mL subcutaneous injection  •  isosorbide mononitrate (IMDUR) 30 mg 24 hr tablet  •  labetalol (NORMODYNE) 200 mg tablet  •  labetalol (NORMODYNE) 200 mg tablet  •  metoclopramide (Reglan) 10 mg tablet  •  minoxidil (LONITEN) 2 5 mg tablet  •  minoxidil (LONITEN) 2 5 mg tablet  •  NIFEdipine ER (ADALAT CC) 60 MG 24 hr tablet  •  NIFEdipine ER (ADALAT CC) 60 MG 24 hr tablet  •  ondansetron (ZOFRAN-ODT) 4 mg disintegrating tablet  •  pantoprazole (PROTONIX) 40 mg tablet  •  sitaGLIPtin (JANUVIA) 25 mg tablet  •  valsartan (DIOVAN) 320 MG tablet    No Known Allergies        Objective      Blood pressure 90/60, pulse 87, height 5' 8" (1 727 m), weight 89 6 kg (197 lb 9 6 oz), SpO2 92 %  Body mass index is 30 04 kg/m²  PHYSICAL EXAM:      General Appearance:   Alert, cooperative, no distress   HEENT:   Normocephalic, atraumatic, anicteric      Neck:  Supple, symmetrical, trachea midline   Lungs:   Clear to auscultation bilaterally; no rales, rhonchi or wheezing; respirations unlabored    Heart[de-identified]   Regular rate and rhythm; no murmur, rub, or gallop  Abdomen:   Soft, non-tender, non-distended; normal bowel sounds; no masses, no organomegaly    Genitalia:   Deferred    Rectal:   Deferred    Extremities:  No cyanosis, clubbing or edema    Pulses:  2+ and symmetric    Skin:  No jaundice, rashes, or lesions    Lymph nodes:  No palpable cervical lymphadenopathy        Lab Results:   No visits with results within 1 Day(s) from this visit     Latest known visit with results is:   Admission on 11/15/2022, Discharged on 11/16/2022   Component Date Value   • WBC 11/15/2022 4 50    • RBC 11/15/2022 3 89    • Hemoglobin 11/15/2022 11 9 (L)    • Hematocrit 11/15/2022 37 6    • MCV 11/15/2022 97    • MCH 11/15/2022 30 6    • MCHC 11/15/2022 31 6    • RDW 11/15/2022 13 8    • MPV 11/15/2022 11 1    • Platelets 44/80/9562 104 (L)    • nRBC 11/15/2022 0    • Neutrophils Relative 11/15/2022 74    • Immat GRANS % 11/15/2022 0    • Lymphocytes Relative 11/15/2022 11 (L)    • Monocytes Relative 11/15/2022 8    • Eosinophils Relative 11/15/2022 6    • Basophils Relative 11/15/2022 1    • Neutrophils Absolute 11/15/2022 3 31    • Immature Grans Absolute 11/15/2022 0 01    • Lymphocytes Absolute 11/15/2022 0 48 (L)    • Monocytes Absolute 11/15/2022 0 38    • Eosinophils Absolute 11/15/2022 0 27    • Basophils Absolute 11/15/2022 0 05    • Sodium 11/15/2022 136    • Potassium 11/15/2022 3 8    • Chloride 11/15/2022 93 (L)    • CO2 11/15/2022 31    • ANION GAP 11/15/2022 12    • BUN 11/15/2022 38 (H)    • Creatinine 11/15/2022 6 45 (H)    • Glucose 11/15/2022 90    • Calcium 11/15/2022 9 2    • AST 11/15/2022 14    • ALT 11/15/2022 11 (L)    • Alkaline Phosphatase 11/15/2022 64    • Total Protein 11/15/2022 7 5    • Albumin 11/15/2022 4 4    • Total Bilirubin 11/15/2022 0 92    • eGFR 11/15/2022 9    • hs TnI 0hr 11/15/2022 46    • Ventricular Rate 11/15/2022 89    • Atrial Rate 11/15/2022 89    • VA Interval 11/15/2022 162    • QRSD Interval 11/15/2022 88    • QT Interval 11/15/2022 406    • QTC Interval 11/15/2022 493    • P Axis 11/15/2022 61    • QRS Axis 11/15/2022 79    • T Wave Axis 11/15/2022 70    • hs TnI 2hr 11/15/2022 52 (H)    • Delta 2hr hsTnI 11/15/2022 6    • hs TnI 4hr 11/15/2022 49    • Delta 4hr hsTnI 11/15/2022 3    • SARS-CoV-2 11/15/2022 Negative    • INFLUENZA A PCR 11/15/2022 Negative    • INFLUENZA B PCR 11/15/2022 Negative    • RSV PCR 11/15/2022 Negative    • WBC 11/16/2022 4 97    • RBC 11/16/2022 3 77 (L)    • Hemoglobin 11/16/2022 11 6 (L)    • Hematocrit 11/16/2022 36 3 (L)    • MCV 11/16/2022 96    • MCH 11/16/2022 30 8    • MCHC 11/16/2022 32 0    • RDW 11/16/2022 13 9    • MPV 11/16/2022 10 7    • Platelets 59/13/0546 114 (L)    • nRBC 11/16/2022 0    • Neutrophils Relative 11/16/2022 70    • Immat GRANS % 11/16/2022 0    • Lymphocytes Relative 11/16/2022 14    • Monocytes Relative 11/16/2022 11    • Eosinophils Relative 11/16/2022 4    • Basophils Relative 11/16/2022 1    • Neutrophils Absolute 11/16/2022 3 46    • Immature Grans Absolute 11/16/2022 0 01    • Lymphocytes Absolute 11/16/2022 0 69    • Monocytes Absolute 11/16/2022 0 55    • Eosinophils Absolute 11/16/2022 0 21    • Basophils Absolute 11/16/2022 0 05    • Sodium 11/16/2022 141    • Potassium 11/16/2022 4 9    • Chloride 11/16/2022 97    • CO2 11/16/2022 31    • ANION GAP 11/16/2022 13    • BUN 11/16/2022 46 (H)    • Creatinine 11/16/2022 7 49 (H)    • Glucose 11/16/2022 71    • Calcium 11/16/2022 8 8    • eGFR 11/16/2022 7          Radiology Results:   CT abdomen pelvis wo contrast    Result Date: 11/15/2022  Narrative: CT ABDOMEN AND PELVIS WITHOUT IV CONTRAST INDICATION:   Abdominal pain, acute, nonlocalized Abd pain and vomiting  COMPARISON:  None  TECHNIQUE:  CT examination of the abdomen and pelvis was performed without intravenous contrast  Axial, sagittal, and coronal 2D reformatted images were created from the source data and submitted for interpretation  Radiation dose length product (DLP) for this visit:  662 mGy-cm   This examination, like all CT scans performed in the [facility], was performed utilizing techniques to minimize radiation dose exposure, including the use of iterative reconstruction and automated exposure control  Enteric contrast was administered  FINDINGS: ABDOMEN LOWER CHEST:  No clinically significant abnormality identified in the visualized lower chest  LIVER/BILIARY TREE:  Unremarkable  GALLBLADDER:  No calcified gallstones  No pericholecystic inflammatory change  SPLEEN:  The spleen is enlarged, measuring  16 2 cm PANCREAS:  Unremarkable  ADRENAL GLANDS:  Unremarkable  KIDNEYS/URETERS:  Unremarkable  No hydronephrosis  STOMACH AND BOWEL:  There is colonic diverticulosis without evidence of acute diverticulitis  APPENDIX:  No findings to suggest appendicitis  ABDOMINOPELVIC CAVITY:  Mild ascites  No pneumoperitoneum  No lymphadenopathy  VESSELS:  Extensive vessel calcification  PELVIS REPRODUCTIVE ORGANS:  Unremarkable for patient's age  URINARY BLADDER:  Unremarkable  ABDOMINAL WALL/INGUINAL REGIONS:  Unremarkable  OSSEOUS STRUCTURES:  No acute fracture or destructive osseous lesion  Impression: No evidence of acute intra-abdominal pelvic pathology  Mild ascites  Splenomegaly  Workstation performed: [id number]     XR chest 1 view portable    Result Date: 11/16/2022  Narrative: CHEST INDICATION:   cough  COMPARISON:  None EXAM PERFORMED/VIEWS:  XR CHEST PORTABLE FINDINGS: Cardiomegaly seen  Mild increased lung markings seen No pneumothorax or pleural effusion  Osseous structures appear within normal limits for patient age       Impression: No acute consolidation or congestion Mild increased lung markings likely due to hypoinflation Workstation performed: [id number]

## 2022-11-30 NOTE — PROGRESS NOTES
[facility] - Outpatient Consultation  Hanna Madden 50 y o  male MRN: [id number]  Encounter: [id number]          ASSESSMENT AND PLAN:      1  Abdominal pain, unspecified abdominal location  2  Gastroesophageal reflux disease without esophagitis  3  Encounter to establish care with new doctor  4  Gastroparesis  -Will plan gastric emptying study   -Will start pantoprazole 40 mg b i d  -Will hold off on starting Reglan therapy until after gastric emptying study is completed  -Encourage patient to be eating multiple small meals throughout the course of the day  -Follow-up in 1 month    ______________________________________________________________________    HPI:    68-year-old male with a past medical history significant for end-stage renal disease on hemodialysis, hypertension, CHF, history of diabetes mellitus, history of gastroparesis presents to the GI clinic today for consultation  Patient reports for the past several months he has been experiencing a cough with dry heaving  Patient does suffer from acid reflux disease with daily heartburn and can not regurgitation  Patient does have a remote history of gastroparesis that he is not actively being treated for  He reports that he does utilize Zofran at home as needed  He reports that his last gastric emptying study was many years ago at [facility]  He reports his last upper endoscopy was at [address] [facility]  He is unsure of these results  Patient reports he is not on PPI therapy  He reports the last time he received Reglan was while he was in the emergency department  REVIEW OF SYSTEMS:    CONSTITUTIONAL: Denies any fever, chills, rigors, and weight loss  HEENT: No earache or tinnitus  Denies hearing loss or visual disturbances  CARDIOVASCULAR: No chest pain or palpitations  RESPIRATORY: Denies any cough, hemoptysis, shortness of breath or dyspnea on exertion    GASTROINTESTINAL: As noted in the History of Present Illness  GENITOURINARY: No problems with urination  Denies any hematuria or dysuria  NEUROLOGIC: No dizziness or vertigo, denies headaches  MUSCULOSKELETAL: Denies any muscle or joint pain  SKIN: Denies skin rashes or itching  ENDOCRINE: Denies excessive thirst  Denies intolerance to heat or cold  PSYCHOSOCIAL: Denies depression or anxiety  Denies any recent memory loss  Historical Information   Past Medical History:   Diagnosis Date   • Hypertension    • Renal disorder      History reviewed  No pertinent surgical history  Social History   Social History     Substance and Sexual Activity   Alcohol Use None     Social History     Substance and Sexual Activity   Drug Use Not on file     Social History     Tobacco Use   Smoking Status Never   Smokeless Tobacco Never     History reviewed  No pertinent family history      Meds/Allergies       Current Outpatient Medications:   •  Albuterol Sulfate 108 (90 Base) MCG/ACT AEPB  •  atorvastatin (LIPITOR) 80 mg tablet  •  atorvastatin (LIPITOR) 80 mg tablet  •  calcium acetate (CALPHRON) 667 mg  •  cloNIDine (CATAPRES-TTS-3) 0 3 mg/24 hr  •  dicyclomine (BENTYL) 10 mg capsule  •  dorzolamide-timolol (COSOPT) 22 3-6 8 MG/ML ophthalmic solution  •  ergocalciferol (ERGOCALCIFEROL) 1 25 MG (12520 UT) capsule  •  furosemide (LASIX) 80 mg tablet  •  insulin glargine (LANTUS) 100 units/mL subcutaneous injection  •  isosorbide mononitrate (IMDUR) 30 mg 24 hr tablet  •  labetalol (NORMODYNE) 200 mg tablet  •  labetalol (NORMODYNE) 200 mg tablet  •  metoclopramide (Reglan) 10 mg tablet  •  minoxidil (LONITEN) 2 5 mg tablet  •  minoxidil (LONITEN) 2 5 mg tablet  •  NIFEdipine ER (ADALAT CC) 60 MG 24 hr tablet  •  NIFEdipine ER (ADALAT CC) 60 MG 24 hr tablet  •  ondansetron (ZOFRAN-ODT) 4 mg disintegrating tablet  •  pantoprazole (PROTONIX) 40 mg tablet  •  sitaGLIPtin (JANUVIA) 25 mg tablet  •  valsartan (DIOVAN) 320 MG tablet    No Known Allergies        Objective     Blood pressure 90/60, pulse 87, height 5' 8" (1 727 m), weight 89 6 kg (197 lb 9 6 oz), SpO2 92 %  Body mass index is 30 04 kg/m²  PHYSICAL EXAM:      General Appearance:   Alert, cooperative, no distress   HEENT:   Normocephalic, atraumatic, anicteric      Neck:  Supple, symmetrical, trachea midline   Lungs:   Clear to auscultation bilaterally; no rales, rhonchi or wheezing; respirations unlabored    Heart[de-identified]   Regular rate and rhythm; no murmur, rub, or gallop  Abdomen:   Soft, non-tender, non-distended; normal bowel sounds; no masses, no organomegaly    Genitalia:   Deferred    Rectal:   Deferred    Extremities:  No cyanosis, clubbing or edema    Pulses:  2+ and symmetric    Skin:  No jaundice, rashes, or lesions    Lymph nodes:  No palpable cervical lymphadenopathy        Lab Results:   No visits with results within 1 Day(s) from this visit     Latest known visit with results is:   Admission on 11/15/2022, Discharged on 11/16/2022   Component Date Value   • WBC 11/15/2022 4 50    • RBC 11/15/2022 3 89    • Hemoglobin 11/15/2022 11 9 (L)    • Hematocrit 11/15/2022 37 6    • MCV 11/15/2022 97    • MCH 11/15/2022 30 6    • MCHC 11/15/2022 31 6    • RDW 11/15/2022 13 8    • MPV 11/15/2022 11 1    • Platelets 00/05/0243 104 (L)    • nRBC 11/15/2022 0    • Neutrophils Relative 11/15/2022 74    • Immat GRANS % 11/15/2022 0    • Lymphocytes Relative 11/15/2022 11 (L)    • Monocytes Relative 11/15/2022 8    • Eosinophils Relative 11/15/2022 6    • Basophils Relative 11/15/2022 1    • Neutrophils Absolute 11/15/2022 3 31    • Immature Grans Absolute 11/15/2022 0 01    • Lymphocytes Absolute 11/15/2022 0 48 (L)    • Monocytes Absolute 11/15/2022 0 38    • Eosinophils Absolute 11/15/2022 0 27    • Basophils Absolute 11/15/2022 0 05    • Sodium 11/15/2022 136    • Potassium 11/15/2022 3 8    • Chloride 11/15/2022 93 (L)    • CO2 11/15/2022 31    • ANION GAP 11/15/2022 12    • BUN 11/15/2022 38 (H)    • Creatinine 11/15/2022 6 45 (H)    • Glucose 11/15/2022 90    • Calcium 11/15/2022 9 2    • AST 11/15/2022 14    • ALT 11/15/2022 11 (L)    • Alkaline Phosphatase 11/15/2022 64    • Total Protein 11/15/2022 7 5    • Albumin 11/15/2022 4 4    • Total Bilirubin 11/15/2022 0 92    • eGFR 11/15/2022 9    • hs TnI 0hr 11/15/2022 46    • Ventricular Rate 11/15/2022 89    • Atrial Rate 11/15/2022 89    • NH Interval 11/15/2022 162    • QRSD Interval 11/15/2022 88    • QT Interval 11/15/2022 406    • QTC Interval 11/15/2022 493    • P Axis 11/15/2022 61    • QRS Axis 11/15/2022 79    • T Wave Axis 11/15/2022 70    • hs TnI 2hr 11/15/2022 52 (H)    • Delta 2hr hsTnI 11/15/2022 6    • hs TnI 4hr 11/15/2022 49    • Delta 4hr hsTnI 11/15/2022 3    • SARS-CoV-2 11/15/2022 Negative    • INFLUENZA A PCR 11/15/2022 Negative    • INFLUENZA B PCR 11/15/2022 Negative    • RSV PCR 11/15/2022 Negative    • WBC 11/16/2022 4 97    • RBC 11/16/2022 3 77 (L)    • Hemoglobin 11/16/2022 11 6 (L)    • Hematocrit 11/16/2022 36 3 (L)    • MCV 11/16/2022 96    • MCH 11/16/2022 30 8    • MCHC 11/16/2022 32 0    • RDW 11/16/2022 13 9    • MPV 11/16/2022 10 7    • Platelets 60/36/8396 114 (L)    • nRBC 11/16/2022 0    • Neutrophils Relative 11/16/2022 70    • Immat GRANS % 11/16/2022 0    • Lymphocytes Relative 11/16/2022 14    • Monocytes Relative 11/16/2022 11    • Eosinophils Relative 11/16/2022 4    • Basophils Relative 11/16/2022 1    • Neutrophils Absolute 11/16/2022 3 46    • Immature Grans Absolute 11/16/2022 0 01    • Lymphocytes Absolute 11/16/2022 0 69    • Monocytes Absolute 11/16/2022 0 55    • Eosinophils Absolute 11/16/2022 0 21    • Basophils Absolute 11/16/2022 0 05    • Sodium 11/16/2022 141    • Potassium 11/16/2022 4 9    • Chloride 11/16/2022 97    • CO2 11/16/2022 31    • ANION GAP 11/16/2022 13    • BUN 11/16/2022 46 (H)    • Creatinine 11/16/2022 7 49 (H)    • Glucose 11/16/2022 71    • Calcium 11/16/2022 8 8    • eGFR 11/16/2022 7          Radiology Results:   CT abdomen pelvis wo contrast    Result Date: 11/15/2022  Narrative: CT ABDOMEN AND PELVIS WITHOUT IV CONTRAST INDICATION:   Abdominal pain, acute, nonlocalized Abd pain and vomiting  COMPARISON:  None  TECHNIQUE:  CT examination of the abdomen and pelvis was performed without intravenous contrast  Axial, sagittal, and coronal 2D reformatted images were created from the source data and submitted for interpretation  Radiation dose length product (DLP) for this visit:  662 mGy-cm   This examination, like all CT scans performed in the [facility], was performed utilizing techniques to minimize radiation dose exposure, including the use of iterative reconstruction and automated exposure control  Enteric contrast was administered  FINDINGS: ABDOMEN LOWER CHEST:  No clinically significant abnormality identified in the visualized lower chest  LIVER/BILIARY TREE:  Unremarkable  GALLBLADDER:  No calcified gallstones  No pericholecystic inflammatory change  SPLEEN:  The spleen is enlarged, measuring  16 2 cm PANCREAS:  Unremarkable  ADRENAL GLANDS:  Unremarkable  KIDNEYS/URETERS:  Unremarkable  No hydronephrosis  STOMACH AND BOWEL:  There is colonic diverticulosis without evidence of acute diverticulitis  APPENDIX:  No findings to suggest appendicitis  ABDOMINOPELVIC CAVITY:  Mild ascites  No pneumoperitoneum  No lymphadenopathy  VESSELS:  Extensive vessel calcification  PELVIS REPRODUCTIVE ORGANS:  Unremarkable for patient's age  URINARY BLADDER:  Unremarkable  ABDOMINAL WALL/INGUINAL REGIONS:  Unremarkable  OSSEOUS STRUCTURES:  No acute fracture or destructive osseous lesion  Impression: No evidence of acute intra-abdominal pelvic pathology  Mild ascites  Splenomegaly  Workstation performed: [id number]     XR chest 1 view portable    Result Date: 11/16/2022  Narrative: CHEST INDICATION:   cough   COMPARISON:  None EXAM PERFORMED/VIEWS: XR CHEST PORTABLE FINDINGS: Cardiomegaly seen  Mild increased lung markings seen No pneumothorax or pleural effusion  Osseous structures appear within normal limits for patient age       Impression: No acute consolidation or congestion Mild increased lung markings likely due to hypoinflation Workstation performed: [id number]

## 2022-12-12 ENCOUNTER — APPOINTMENT (EMERGENCY)
Dept: CT IMAGING | Facility: HOSPITAL | Age: 47
End: 2022-12-12

## 2022-12-12 ENCOUNTER — APPOINTMENT (OUTPATIENT)
Dept: DIALYSIS | Facility: HOSPITAL | Age: 47
End: 2022-12-12

## 2022-12-12 ENCOUNTER — HOSPITAL ENCOUNTER (OUTPATIENT)
Facility: HOSPITAL | Age: 47
Setting detail: OBSERVATION
Discharge: HOME | End: 2022-12-14
Attending: EMERGENCY MEDICINE | Admitting: INTERNAL MEDICINE

## 2022-12-12 DIAGNOSIS — R91.8 ABNORMAL FINDING ON LUNG IMAGING: ICD-10-CM

## 2022-12-12 DIAGNOSIS — I16.0 HYPERTENSIVE URGENCY: ICD-10-CM

## 2022-12-12 DIAGNOSIS — R06.02 SHORTNESS OF BREATH: ICD-10-CM

## 2022-12-12 DIAGNOSIS — R05.9 COUGH, UNSPECIFIED TYPE: ICD-10-CM

## 2022-12-12 DIAGNOSIS — K31.84 GASTROPARESIS: ICD-10-CM

## 2022-12-12 DIAGNOSIS — N18.6 ESRD (END STAGE RENAL DISEASE) (HCC): Primary | ICD-10-CM

## 2022-12-12 PROBLEM — I15.1 HYPERTENSION SECONDARY TO OTHER RENAL DISORDERS: Status: ACTIVE | Noted: 2022-12-12

## 2022-12-12 PROBLEM — G89.29 CHRONIC BILATERAL THORACIC BACK PAIN: Status: ACTIVE | Noted: 2022-01-01

## 2022-12-12 PROBLEM — I50.32 CHRONIC DIASTOLIC CONGESTIVE HEART FAILURE (HCC): Status: ACTIVE | Noted: 2022-11-15

## 2022-12-12 PROBLEM — N28.89 HYPERTENSION SECONDARY TO OTHER RENAL DISORDERS: Status: ACTIVE | Noted: 2022-12-12

## 2022-12-12 PROBLEM — R07.89 OTHER CHEST PAIN: Status: ACTIVE | Noted: 2022-12-12

## 2022-12-12 PROBLEM — I16.1 HYPERTENSIVE EMERGENCY: Status: ACTIVE | Noted: 2022-12-12

## 2022-12-12 PROBLEM — M54.6 CHRONIC BILATERAL THORACIC BACK PAIN: Status: ACTIVE | Noted: 2022-12-12

## 2022-12-12 LAB
2HR DELTA HS TROPONIN: 4 NG/L
4HR DELTA HS TROPONIN: 1 NG/L
ALBUMIN SERPL BCP-MCNC: 4.2 G/DL (ref 3.5–5)
ALP SERPL-CCNC: 56 U/L (ref 46–116)
ALT SERPL W P-5'-P-CCNC: 24 U/L (ref 12–78)
AMMONIA PLAS-SCNC: 23 UMOL/L (ref 11–35)
ANION GAP SERPL CALCULATED.3IONS-SCNC: 15 MMOL/L (ref 4–13)
AST SERPL W P-5'-P-CCNC: 25 U/L (ref 5–45)
BASOPHILS # BLD AUTO: 0.04 THOUSANDS/ÂΜL (ref 0–0.1)
BASOPHILS NFR BLD AUTO: 1 % (ref 0–1)
BILIRUB SERPL-MCNC: 0.67 MG/DL (ref 0.2–1)
BUN SERPL-MCNC: 79 MG/DL (ref 5–25)
CALCIUM SERPL-MCNC: 8.3 MG/DL (ref 8.3–10.1)
CARDIAC TROPONIN I PNL SERPL HS: 20 NG/L
CARDIAC TROPONIN I PNL SERPL HS: 21 NG/L
CARDIAC TROPONIN I PNL SERPL HS: 24 NG/L
CHLORIDE SERPL-SCNC: 97 MMOL/L (ref 96–108)
CO2 SERPL-SCNC: 27 MMOL/L (ref 21–32)
CREAT SERPL-MCNC: 8.29 MG/DL (ref 0.6–1.3)
EOSINOPHIL # BLD AUTO: 0.14 THOUSAND/ÂΜL (ref 0–0.61)
EOSINOPHIL NFR BLD AUTO: 5 % (ref 0–6)
ERYTHROCYTE [DISTWIDTH] IN BLOOD BY AUTOMATED COUNT: 14.9 % (ref 11.6–15.1)
FLUAV RNA RESP QL NAA+PROBE: NEGATIVE
FLUBV RNA RESP QL NAA+PROBE: NEGATIVE
GFR SERPL CREATININE-BSD FRML MDRD: 6 ML/MIN/1.73SQ M
GLUCOSE SERPL-MCNC: 111 MG/DL (ref 65–140)
GLUCOSE SERPL-MCNC: 206 MG/DL (ref 65–140)
HCT VFR BLD AUTO: 30.9 % (ref 36.5–49.3)
HGB BLD-MCNC: 9.5 G/DL (ref 12–17)
IMM GRANULOCYTES # BLD AUTO: 0.01 THOUSAND/UL (ref 0–0.2)
IMM GRANULOCYTES NFR BLD AUTO: 0 % (ref 0–2)
INR PPP: 1.31 (ref 0.84–1.19)
LIPASE SERPL-CCNC: 128 U/L (ref 73–393)
LYMPHOCYTES # BLD AUTO: 0.39 THOUSANDS/ÂΜL (ref 0.6–4.47)
LYMPHOCYTES NFR BLD AUTO: 14 % (ref 14–44)
MCH RBC QN AUTO: 31 PG (ref 26.8–34.3)
MCHC RBC AUTO-ENTMCNC: 30.7 G/DL (ref 31.4–37.4)
MCV RBC AUTO: 101 FL (ref 82–98)
MONOCYTES # BLD AUTO: 0.22 THOUSAND/ÂΜL (ref 0.17–1.22)
MONOCYTES NFR BLD AUTO: 8 % (ref 4–12)
NEUTROPHILS # BLD AUTO: 2 THOUSANDS/ÂΜL (ref 1.85–7.62)
NEUTS SEG NFR BLD AUTO: 72 % (ref 43–75)
NRBC BLD AUTO-RTO: 0 /100 WBCS
NT-PROBNP SERPL-MCNC: ABNORMAL PG/ML
PLATELET # BLD AUTO: 102 THOUSANDS/UL (ref 149–390)
PMV BLD AUTO: 10.9 FL (ref 8.9–12.7)
POTASSIUM SERPL-SCNC: 5.8 MMOL/L (ref 3.5–5.3)
PROT SERPL-MCNC: 7.4 G/DL (ref 6.4–8.4)
PROTHROMBIN TIME: 16.1 SECONDS (ref 11.6–14.5)
RBC # BLD AUTO: 3.06 MILLION/UL (ref 3.88–5.62)
RSV RNA RESP QL NAA+PROBE: NEGATIVE
SARS-COV-2 RNA RESP QL NAA+PROBE: NEGATIVE
SODIUM SERPL-SCNC: 139 MMOL/L (ref 135–147)
WBC # BLD AUTO: 2.8 THOUSAND/UL (ref 4.31–10.16)

## 2022-12-12 RX ORDER — ALBUTEROL SULFATE 90 UG/1
2 AEROSOL, METERED RESPIRATORY (INHALATION) EVERY 4 HOURS PRN
Status: DISCONTINUED | OUTPATIENT
Start: 2022-12-12 | End: 2022-12-14 | Stop reason: HOSPADM

## 2022-12-12 RX ORDER — LABETALOL 200 MG/1
600 TABLET, FILM COATED ORAL 2 TIMES DAILY
Status: DISCONTINUED | OUTPATIENT
Start: 2022-12-12 | End: 2022-12-14 | Stop reason: HOSPADM

## 2022-12-12 RX ORDER — DORZOLAMIDE HYDROCHLORIDE AND TIMOLOL MALEATE 20; 5 MG/ML; MG/ML
1 SOLUTION/ DROPS OPHTHALMIC 2 TIMES DAILY
Status: DISCONTINUED | OUTPATIENT
Start: 2022-12-12 | End: 2022-12-14 | Stop reason: HOSPADM

## 2022-12-12 RX ORDER — INSULIN GLARGINE 100 [IU]/ML
10 INJECTION, SOLUTION SUBCUTANEOUS
Status: DISCONTINUED | OUTPATIENT
Start: 2022-12-12 | End: 2022-12-14

## 2022-12-12 RX ORDER — FUROSEMIDE 80 MG
80 TABLET ORAL DAILY
Status: DISCONTINUED | OUTPATIENT
Start: 2022-12-13 | End: 2022-12-14 | Stop reason: HOSPADM

## 2022-12-12 RX ORDER — ISOSORBIDE MONONITRATE 30 MG/1
30 TABLET, EXTENDED RELEASE ORAL DAILY
Status: DISCONTINUED | OUTPATIENT
Start: 2022-12-12 | End: 2022-12-14 | Stop reason: HOSPADM

## 2022-12-12 RX ORDER — PANTOPRAZOLE SODIUM 40 MG/1
40 TABLET, DELAYED RELEASE ORAL
Status: DISCONTINUED | OUTPATIENT
Start: 2022-12-13 | End: 2022-12-14 | Stop reason: HOSPADM

## 2022-12-12 RX ORDER — MINOXIDIL 2.5 MG/1
2.5 TABLET ORAL 2 TIMES DAILY
Status: DISCONTINUED | OUTPATIENT
Start: 2022-12-12 | End: 2022-12-14 | Stop reason: HOSPADM

## 2022-12-12 RX ORDER — LIDOCAINE 50 MG/G
1 PATCH TOPICAL DAILY
Status: DISCONTINUED | OUTPATIENT
Start: 2022-12-12 | End: 2022-12-14 | Stop reason: HOSPADM

## 2022-12-12 RX ORDER — ATORVASTATIN CALCIUM 40 MG/1
80 TABLET, FILM COATED ORAL DAILY
Status: DISCONTINUED | OUTPATIENT
Start: 2022-12-12 | End: 2022-12-14 | Stop reason: HOSPADM

## 2022-12-12 RX ORDER — NIFEDIPINE 60 MG/1
60 TABLET, EXTENDED RELEASE ORAL 2 TIMES DAILY
Status: DISCONTINUED | OUTPATIENT
Start: 2022-12-12 | End: 2022-12-14 | Stop reason: HOSPADM

## 2022-12-12 RX ORDER — HYDROMORPHONE HCL IN WATER/PF 6 MG/30 ML
0.2 PATIENT CONTROLLED ANALGESIA SYRINGE INTRAVENOUS
Status: DISCONTINUED | OUTPATIENT
Start: 2022-12-12 | End: 2022-12-14

## 2022-12-12 RX ORDER — HYDROMORPHONE HCL/PF 1 MG/ML
0.5 SYRINGE (ML) INJECTION EVERY 6 HOURS PRN
Status: DISCONTINUED | OUTPATIENT
Start: 2022-12-12 | End: 2022-12-14

## 2022-12-12 RX ORDER — CALCIUM ACETATE 667 MG/1
667 CAPSULE ORAL
Status: DISCONTINUED | OUTPATIENT
Start: 2022-12-12 | End: 2022-12-14 | Stop reason: HOSPADM

## 2022-12-12 RX ORDER — LOSARTAN POTASSIUM 50 MG/1
100 TABLET ORAL DAILY
Status: DISCONTINUED | OUTPATIENT
Start: 2022-12-12 | End: 2022-12-14 | Stop reason: HOSPADM

## 2022-12-12 RX ORDER — DICYCLOMINE HYDROCHLORIDE 10 MG/1
10 CAPSULE ORAL
Status: DISCONTINUED | OUTPATIENT
Start: 2022-12-12 | End: 2022-12-14 | Stop reason: HOSPADM

## 2022-12-12 RX ORDER — HYDRALAZINE HYDROCHLORIDE 20 MG/ML
10 INJECTION INTRAMUSCULAR; INTRAVENOUS EVERY 6 HOURS PRN
Status: DISCONTINUED | OUTPATIENT
Start: 2022-12-12 | End: 2022-12-14 | Stop reason: HOSPADM

## 2022-12-12 RX ORDER — HEPARIN SODIUM 5000 [USP'U]/ML
5000 INJECTION, SOLUTION INTRAVENOUS; SUBCUTANEOUS EVERY 8 HOURS SCHEDULED
Status: DISCONTINUED | OUTPATIENT
Start: 2022-12-12 | End: 2022-12-14 | Stop reason: HOSPADM

## 2022-12-12 RX ORDER — CALCIUM GLUCONATE 20 MG/ML
1 INJECTION, SOLUTION INTRAVENOUS ONCE
Status: DISCONTINUED | OUTPATIENT
Start: 2022-12-12 | End: 2022-12-14 | Stop reason: HOSPADM

## 2022-12-12 RX ORDER — CLONIDINE 0.3 MG/24H
1 PATCH, EXTENDED RELEASE TRANSDERMAL WEEKLY
Status: DISCONTINUED | OUTPATIENT
Start: 2022-12-12 | End: 2022-12-14 | Stop reason: HOSPADM

## 2022-12-12 RX ORDER — HYDROMORPHONE HCL/PF 1 MG/ML
1 SYRINGE (ML) INJECTION EVERY 6 HOURS PRN
Status: DISCONTINUED | OUTPATIENT
Start: 2022-12-12 | End: 2022-12-14

## 2022-12-12 RX ADMIN — LABETALOL HYDROCHLORIDE 600 MG: 200 TABLET, FILM COATED ORAL at 17:54

## 2022-12-12 RX ADMIN — DORZOLAMIDE HYDROCHLORIDE AND TIMOLOL MALEATE 1 DROP: 22.3; 6.8 SOLUTION/ DROPS OPHTHALMIC at 21:47

## 2022-12-12 RX ADMIN — DICYCLOMINE HYDROCHLORIDE 10 MG: 10 CAPSULE ORAL at 21:53

## 2022-12-12 RX ADMIN — ATORVASTATIN CALCIUM 80 MG: 40 TABLET, FILM COATED ORAL at 17:06

## 2022-12-12 RX ADMIN — HYDRALAZINE HYDROCHLORIDE 10 MG: 20 INJECTION INTRAMUSCULAR; INTRAVENOUS at 23:33

## 2022-12-12 RX ADMIN — CALCIUM ACETATE 667 MG: 667 CAPSULE ORAL at 17:53

## 2022-12-12 RX ADMIN — HYDROMORPHONE HYDROCHLORIDE 0.5 MG: 1 INJECTION, SOLUTION INTRAMUSCULAR; INTRAVENOUS; SUBCUTANEOUS at 21:52

## 2022-12-12 RX ADMIN — MINOXIDIL 2.5 MG: 2.5 TABLET ORAL at 17:54

## 2022-12-12 RX ADMIN — LIDOCAINE 5% 1 PATCH: 700 PATCH TOPICAL at 17:06

## 2022-12-12 RX ADMIN — LOSARTAN POTASSIUM 100 MG: 50 TABLET, FILM COATED ORAL at 17:23

## 2022-12-12 RX ADMIN — HYDROMORPHONE HYDROCHLORIDE 0.2 MG: 0.2 INJECTION, SOLUTION INTRAMUSCULAR; INTRAVENOUS; SUBCUTANEOUS at 23:36

## 2022-12-12 RX ADMIN — CLONIDINE 0.3 MG: 0.3 PATCH TRANSDERMAL at 17:56

## 2022-12-12 RX ADMIN — ISOSORBIDE MONONITRATE 30 MG: 30 TABLET, EXTENDED RELEASE ORAL at 21:46

## 2022-12-12 RX ADMIN — DICYCLOMINE HYDROCHLORIDE 10 MG: 10 CAPSULE ORAL at 17:06

## 2022-12-12 RX ADMIN — NIFEDIPINE 60 MG: 60 TABLET, FILM COATED, EXTENDED RELEASE ORAL at 22:11

## 2022-12-12 RX ADMIN — HYDROMORPHONE HYDROCHLORIDE 0.5 MG: 1 INJECTION, SOLUTION INTRAMUSCULAR; INTRAVENOUS; SUBCUTANEOUS at 16:05

## 2022-12-12 NOTE — ASSESSMENT & PLAN NOTE
Wt Readings from Last 3 Encounters:   12/12/22 86 2 kg (190 lb)   11/30/22 89 6 kg (197 lb 9 6 oz)   11/15/22 81 6 kg (180 lb)       No results found for: LVEF, NTBNP    • Presents with increased abdominal girth  • Patient is currently volume overloaded  Plan:  • GDMT: Diuretic: Lasix 80 mg qd, B-Blocker: Labetalol 600 mg p o  twice daily, Nitrate: Imdur 30 mg p o  daily ACE/ARB/ARNi: Losartan 100 mg  • Volume control with dialysis as well; nephrology on board  · Diet: Sodium restriction 4g  · Labs: BMP, magnesium tomorrow a m    · Maintain Mg > 2 and K > 4; Replete prn

## 2022-12-12 NOTE — H&P
[facility]  H&P- Celestine Sandifer 1975, 50 y o  male MRN: [id number]  Unit/Bed#: Z2H5 Encounter: [id number]  Primary Care Provider: No primary care provider on file     Date and time admitted to hospital: 12/12/2022 12:18 PM    * Hypertensive emergency  Assessment & Plan  Blood Pressure: (!) 223/102  Chest pain and abd distension along with headaches  Missed his home meds on AM of admission    Plan:  • Continue home meds  • Hydralazine 10 mg q6h prn for SBP > 200  • Monitor blood pressure  · Aim for SBP no less than 160 in 24 hours    ESRD (end stage renal disease) [facility])  Assessment & Plan  Lab Results   Component Value Date    EGFR 6 12/12/2022    EGFR 7 11/16/2022    EGFR 9 11/15/2022    CREATININE 8 29 (H) 12/12/2022    CREATININE 7 49 (H) 11/16/2022    CREATININE 6 45 (H) 11/15/2022     ESRD on HD Tuesday Thursday Saturday  Presenting for session of dialysis today followed by additional session of dialysis tomorrow as per recommendations from outpatient nephrology  Nephrology consulted for dialysis    Other chest pain  Assessment & Plan  BYRON score of 1    Lab Results   Component Value Date    HSTNI0 20 12/12/2022    HSTNI2 24 12/12/2022    HSTNID2 4 12/12/2022     No ST changes noted on EKG  Likely attributed to volume overload state  Monitor for worsening symptoms; if worsening cp, recheck troponin, EKG stat    Hypertension secondary to other renal disorders  Assessment & Plan  Blood Pressure: (!) 223/102    Home medications nifedipine, valsartan, labetalol, clonidine  Missed medications on day of admission  Management under HTV emergency    Chronic bilateral thoracic back pain  Assessment & Plan  Complains of bilateral thoracic back pain  Has a history of lumbar disc disease    Lidoderm patch  Pain management regimen with IV medications    Chronic diastolic congestive heart failure ([facility] [address] )  Assessment & Plan  Wt Readings from Last 3 Encounters:   12/12/22 86 2 kg (190 lb)   11/30/22 89 6 kg (197 lb 9 6 oz)   11/15/22 81 6 kg (180 lb)       No results found for: LVEF, NTBNP    • Presents with increased abdominal girth  • Patient is currently volume overloaded  Plan:  • GDMT: Diuretic: Lasix 80 mg qd, B-Blocker: Labetalol 600 mg p o  twice daily, Nitrate: Imdur 30 mg p o  daily ACE/ARB/ARNi: Losartan 100 mg  • Volume control with dialysis as well; nephrology on board  · Diet: Sodium restriction 4g  · Labs: BMP, magnesium tomorrow a m  · Maintain Mg > 2 and K > 4; Replete prn            VTE Pharmacologic Prophylaxis: VTE Score: 5 High Risk (Score >/= 5) - Pharmacological DVT Prophylaxis Ordered: heparin  Sequential Compression Devices Ordered  Code Status: Level 1 - Full Code   Discussion with Patient/Family: patient  Anticipated Length of Stay: Patient will be admitted on an observation basis with an anticipated length of stay of less than 2 midnights secondary to plan above  Total Time for Visit, including Counseling / Coordination of Care: 70 minutes Greater than 50% of this total time spent on direct patient counseling and coordination of care  Chief Complaint:   Chief Complaint   Patient presents with   • Chest Pain   • Shortness of Breath     Pt reports CP and SOB that began yesterday  History of Present Illness:  Earlis Sandhoff is a 50 y o  male who presents with needing dialysis due to volume overload; was asked by nephrology team to come to the ED for work-up in the hospital      Past medical history significant for end-stage renal disease on dialysis TTS  Patient underwent double session of dialysis on Saturday  Still felt that he was having volume retention specifically in his abdomen  Felt that his abdomen was quite tight and was causing mild difficulty with taking deep breaths  In addition, patient was complaining of chest pain across his chest that was nonradiating, worsened with laying flat and mild improvement while leaning forward      Review of Systems:  A 10-point review of systems was obtained  Pertinent positives and negatives are outlined in the HPI above  Remainder of review of systems are otherwise negative  Past Medical and Surgical History:   Past Medical History:   Diagnosis Date   • Hypertension    • Renal disorder        History reviewed  No pertinent surgical history  Meds/Allergies:  Prior to Admission medications    Medication Sig Start Date End Date Taking?  Authorizing Provider   Albuterol Sulfate 108 (90 Base) MCG/ACT AEPB Inhale 2 puffs    Historical Provider, MD   atorvastatin (LIPITOR) 80 mg tablet Take 1 tablet (80 mg total) by mouth daily 11/16/22   Moe Colorado MD   calcium acetate (CALPHRON) 667 mg Take by mouth 2 (two) times a day as needed (With snacks)    Historical Provider, MD   cloNIDine (CATAPRES-TTS-3) 0 3 mg/24 hr Place 1 patch (0 3 mg total) on the skin once a week 11/16/22 1/15/23  Moe Colorado MD   dicyclomine (BENTYL) 10 mg capsule Take 1 capsule (10 mg total) by mouth 4 (four) times a day (before meals and at bedtime) 11/16/22 1/15/23  Moe Colorado MD   dorzolamide-timolol (COSOPT) 22 3-6 8 MG/ML ophthalmic solution Administer 1 drop to both eyes 2 (two) times a day    Historical Provider, MD   ergocalciferol (ERGOCALCIFEROL) 1 25 MG (98798 UT) capsule Take 50,000 Units by mouth every 7 days    Historical Provider, MD   furosemide (LASIX) 80 mg tablet Take 1 tablet (80 mg total) by mouth 2 (two) times a day 11/16/22 1/15/23  Moe Colorado MD   insulin glargine (LANTUS) 100 units/mL subcutaneous injection Inject 10 Units under the skin Daily    Historical Provider, MD   isosorbide mononitrate (IMDUR) 30 mg 24 hr tablet Take 1 tablet (30 mg total) by mouth daily 11/16/22 1/15/23  Moe Colorado MD   labetalol (NORMODYNE) 200 mg tablet Take 3 tablets (600 mg total) by mouth 2 (two) times a day 11/16/22 1/15/23  Moe Colorado MD   metoclopramide (Reglan) 10 mg tablet Take 1 tablet (10 mg total) by mouth 4 (four) times a day 11/16/22 1/15/23  Damon Ann MD   minoxidil (LONITEN) 2 5 mg tablet Take 1 tablet (2 5 mg total) by mouth 2 (two) times a day 11/16/22 1/15/23  Damon Ann MD   NIFEdipine ER (ADALAT CC) 60 MG 24 hr tablet Take 1 tablet (60 mg total) by mouth 2 (two) times a day 11/16/22 1/15/23  Damon Ann MD   ondansetron (ZOFRAN-ODT) 4 mg disintegrating tablet Take 4 mg by mouth every 6 (six) hours as needed for nausea or vomiting    Historical Provider, MD   pantoprazole (PROTONIX) 40 mg tablet Take 1 tablet (40 mg total) by mouth 2 (two) times a day 11/30/22   Jorden Cortez PA-C   sitaGLIPtin (JANUVIA) 25 mg tablet Take 25 mg by mouth daily    Historical Provider, MD   valsartan (DIOVAN) 320 MG tablet Take 1 tablet (320 mg total) by mouth daily 11/16/22 1/15/23  Damon Ann MD   atorvastatin (LIPITOR) 80 mg tablet Take 80 mg by mouth daily 6/21/22 12/12/22  Historical Provider, MD   labetalol (NORMODYNE) 200 mg tablet Take 200 mg by mouth 2 (two) times a day  12/12/22  Historical Provider, MD   minoxidil (LONITEN) 2 5 mg tablet Take 2 5 mg by mouth 2 (two) times a day  12/12/22  Historical Provider, MD   NIFEdipine ER (ADALAT CC) 60 MG 24 hr tablet Take 60 mg by mouth 2 (two) times a day  12/12/22  Historical Provider, MD     I have reviewed home medications with patient personally  Allergies: No Known Allergies    Social History:  Marital Status: Single   Occupation: employed  Patient Pre-hospital Living Situation: Home  Patient Pre-hospital Level of Mobility: walks  Patient Pre-hospital Diet Restrictions: none  Substance Use History:   Social History     Substance and Sexual Activity   Alcohol Use None     Social History     Tobacco Use   Smoking Status Never   Smokeless Tobacco Never     Social History     Substance and Sexual Activity   Drug Use Not on file       Family History:  History reviewed  No pertinent family history      Physical Exam: Vitals:   Blood Pressure: (!) 223/102 (12/12/22 1723)  Pulse: 75 (12/12/22 1723)  Temperature: 98 7 °F (37 1 °C) (12/12/22 1215)  Temp Source: Oral (12/12/22 1215)  Respirations: 18 (12/12/22 1723)  Weight - Scale: 86 2 kg (190 lb) (12/12/22 1215)  SpO2: 92 % (12/12/22 1723)    Physical Exam  Vitals reviewed  Constitutional:       General: He is not in acute distress  Appearance: Normal appearance  HENT:      Head: Normocephalic and atraumatic  Right Ear: External ear normal       Left Ear: External ear normal       Nose: Nose normal       Mouth/Throat:      Mouth: Mucous membranes are moist    Eyes:      Pupils: Pupils are equal, round, and reactive to light  Cardiovascular:      Rate and Rhythm: Normal rate and regular rhythm  Pulses: Normal pulses  Heart sounds: Normal heart sounds  No murmur heard  Pulmonary:      Effort: Pulmonary effort is normal  No respiratory distress  Breath sounds: Normal breath sounds  No wheezing or rales  Chest:      Chest wall: Tenderness present  Abdominal:      General: Abdomen is protuberant  Bowel sounds are normal  There is no distension  Palpations: Abdomen is rigid  There is no mass  Tenderness: There is generalized abdominal tenderness (generalized )  There is no guarding  Musculoskeletal:         General: No swelling  Cervical back: Normal range of motion and neck supple  No rigidity or tenderness  Thoracic back: Tenderness present  Lumbar back: Tenderness present  Skin:     General: Skin is warm and dry  Capillary Refill: Capillary refill takes less than 2 seconds  Findings: No lesion or rash  Neurological:      General: No focal deficit present  Mental Status: He is alert  Psychiatric:         Mood and Affect: Mood normal          Behavior: Behavior normal          Thought Content:  Thought content normal          Judgment: Judgment normal         Additional Data:     Lab Results:  Results from last 7 days   Lab Units 12/12/22  1318   WBC Thousand/uL 2 80*   HEMOGLOBIN g/dL 9 5*   HEMATOCRIT % 30 9*   PLATELETS Thousands/uL 102*   NEUTROS PCT % 72   LYMPHS PCT % 14   MONOS PCT % 8   EOS PCT % 5     Results from last 7 days   Lab Units 12/12/22  1318   SODIUM mmol/L 139   POTASSIUM mmol/L 5 8*   CHLORIDE mmol/L 97   CO2 mmol/L 27   BUN mg/dL 79*   CREATININE mg/dL 8 29*   ANION GAP mmol/L 15*   CALCIUM mg/dL 8 3   ALBUMIN g/dL 4 2   TOTAL BILIRUBIN mg/dL 0 67   ALK PHOS U/L 56   ALT U/L 24   AST U/L 25   GLUCOSE RANDOM mg/dL 206*     Results from last 7 days   Lab Units 12/12/22  1318   INR  1 31*     Results from last 7 days   Lab Units 12/12/22  1757   POC GLUCOSE mg/dl 111               Imaging Results Reviewed as noted below  CT chest abdomen pelvis wo contrast   Final Result by Cyndie Murphy MD (12/12 1343)      CT CHEST:      Multiple, somewhat irregular, linear and tubular subpleural opacities in both lungs, most most pronounced anteriorly in the right middle and left upper lobes  These may be related to areas of scarring/atelectasis, or possibly pneumonia  Mild mosaic attenuation of the lung parenchyma, which may be seen in the setting of small vessel or small airways disease  Some areas of interlobular septal thickening suggest mild pulmonary edema  Mild mediastinal lymphadenopathy, possibly reactive  Moderate cardiomegaly  Trace pericardial effusion  Main pulmonary artery dilated up to 3 5 cm, suggestive of pulmonary arterial hypertension  CT ABDOMEN/PELVIS:      No definite evidence of acute abdominal or pelvic pathology within limitation of a noncontrast study  Small abdominal and pelvic ascites, slightly increased compared to 1/15/2022  No organized collections to suggest an abscess  Hepatosplenomegaly  Anasarca  Multiple additional findings as above  The study was marked in [facility] for immediate notification         Workstation performed: [id number]             CT chest abdomen pelvis wo contrast    Result Date: 12/12/2022  Impression: CT CHEST: Multiple, somewhat irregular, linear and tubular subpleural opacities in both lungs, most most pronounced anteriorly in the right middle and left upper lobes  These may be related to areas of scarring/atelectasis, or possibly pneumonia  Mild mosaic attenuation of the lung parenchyma, which may be seen in the setting of small vessel or small airways disease  Some areas of interlobular septal thickening suggest mild pulmonary edema  Mild mediastinal lymphadenopathy, possibly reactive  Moderate cardiomegaly  Trace pericardial effusion  Main pulmonary artery dilated up to 3 5 cm, suggestive of pulmonary arterial hypertension  CT ABDOMEN/PELVIS: No definite evidence of acute abdominal or pelvic pathology within limitation of a noncontrast study  Small abdominal and pelvic ascites, slightly increased compared to 1/15/2022  No organized collections to suggest an abscess  Hepatosplenomegaly  Anasarca  Multiple additional findings as above  The study was marked in [facility] for immediate notification  Workstation performed: [id number]     No Chest XR results available for this patient  EKG and Other Studies Reviewed on Admission:   · EKG: NSR 96    No results for input(s): VENTRATE in the last 72 hours  ** Please Note: This note has been constructed using a voice recognition system   **

## 2022-12-12 NOTE — ASSESSMENT & PLAN NOTE
Blood Pressure: (!) 223/102  Chest pain and abd distension along with headaches  Missed his home meds on AM of admission    Plan:  • Continue home meds  • Hydralazine 10 mg q6h prn for SBP > 200  • Monitor blood pressure  · Aim for SBP no less than 160 in 24 hours

## 2022-12-12 NOTE — CONSULTS
Consultation - Nephrology   Celestine Sandifer 50 y o  male MRN: [id number]  Unit/Bed#: [facility] Encounter: [id number]    Referring PHYSICIAN: Lexi Dowell    REASON FOR THE CONSULTATION: ESRD    DATE OF CONSULTATION: December 12, 2022    ADMISSION DIAGNOSIS: <principal problem not specified>     CHIEF COMPLAINT     Patient with ESRD on dialysis almost for a year with diabetic nephropathy came to the hospital with worsening shortness of breath and anasarca and being admitted for the same    HPI     Patient with ESRD on dialysis Tuesday Thursday Saturday at [facility]    Patient last dialysis was on Saturday at that time they removed 5 kg off him    Since Sunday he is getting more more short of breath  Does have orthopnea and PND  Denies any chest pain or palpitation  Denies any dietary noncompliance but patient does gain quite a bit of weight in between dialysis as outpatient    Patient does have diabetic nephropathy with ESRD  Also has hypertension    PAST MEDICAL HISTORY     Past Medical History:   Diagnosis Date   • Hypertension    • Renal disorder        PAST SURGICAL HISTORY     History reviewed  No pertinent surgical history  ALLERGIES     No Known Allergies    SOCIAL HISTORY     Social History     Substance and Sexual Activity   Alcohol Use None     Social History     Substance and Sexual Activity   Drug Use Not on file     Social History     Tobacco Use   Smoking Status Never   Smokeless Tobacco Never       FAMILY HISTORY     History reviewed  No pertinent family history      CURRENT MEDICATIONS       Current Facility-Administered Medications:   •  [START ON 12/13/2022] epoetin raquel (EPOGEN,PROCRIT) injection 8,000 Units, 8,000 Units, Intravenous, Once per day on Tue Thu Lubna David MD    Current Outpatient Medications:   •  Albuterol Sulfate 108 (90 Base) MCG/ACT AEPB, Inhale 2 puffs, Disp: , Rfl:   •  atorvastatin (LIPITOR) 80 mg tablet, Take 1 tablet (80 mg total) by mouth daily, Disp: 60 tablet, Rfl: 0  •  atorvastatin (LIPITOR) 80 mg tablet, Take 80 mg by mouth daily, Disp: , Rfl:   •  calcium acetate (CALPHRON) 667 mg, Take by mouth 2 (two) times a day as needed (With snacks), Disp: , Rfl:   •  cloNIDine (CATAPRES-TTS-3) 0 3 mg/24 hr, Place 1 patch (0 3 mg total) on the skin once a week, Disp: 8 patch, Rfl: 0  •  dicyclomine (BENTYL) 10 mg capsule, Take 1 capsule (10 mg total) by mouth 4 (four) times a day (before meals and at bedtime), Disp: 240 capsule, Rfl: 0  •  dorzolamide-timolol (COSOPT) 22 3-6 8 MG/ML ophthalmic solution, Administer 1 drop to both eyes 2 (two) times a day, Disp: , Rfl:   •  ergocalciferol (ERGOCALCIFEROL) 1 25 MG (22063 UT) capsule, Take 50,000 Units by mouth every 7 days, Disp: , Rfl:   •  furosemide (LASIX) 80 mg tablet, Take 1 tablet (80 mg total) by mouth 2 (two) times a day, Disp: 120 tablet, Rfl: 0  •  insulin glargine (LANTUS) 100 units/mL subcutaneous injection, Inject 10 Units under the skin Daily, Disp: , Rfl:   •  isosorbide mononitrate (IMDUR) 30 mg 24 hr tablet, Take 1 tablet (30 mg total) by mouth daily, Disp: 60 tablet, Rfl: 0  •  labetalol (NORMODYNE) 200 mg tablet, Take 3 tablets (600 mg total) by mouth 2 (two) times a day, Disp: 360 tablet, Rfl: 0  •  labetalol (NORMODYNE) 200 mg tablet, Take 200 mg by mouth 2 (two) times a day, Disp: , Rfl:   •  metoclopramide (Reglan) 10 mg tablet, Take 1 tablet (10 mg total) by mouth 4 (four) times a day, Disp: 240 tablet, Rfl: 0  •  minoxidil (LONITEN) 2 5 mg tablet, Take 1 tablet (2 5 mg total) by mouth 2 (two) times a day, Disp: 120 tablet, Rfl: 0  •  minoxidil (LONITEN) 2 5 mg tablet, Take 2 5 mg by mouth 2 (two) times a day, Disp: , Rfl:   •  NIFEdipine ER (ADALAT CC) 60 MG 24 hr tablet, Take 1 tablet (60 mg total) by mouth 2 (two) times a day, Disp: 120 tablet, Rfl: 0  •  NIFEdipine ER (ADALAT CC) 60 MG 24 hr tablet, Take 60 mg by mouth 2 (two) times a day, Disp: , Rfl:   •  ondansetron (ZOFRAN-ODT) 4 mg disintegrating tablet, Take 4 mg by mouth every 6 (six) hours as needed for nausea or vomiting, Disp: , Rfl:   •  pantoprazole (PROTONIX) 40 mg tablet, Take 1 tablet (40 mg total) by mouth 2 (two) times a day, Disp: 60 tablet, Rfl: 3  •  sitaGLIPtin (JANUVIA) 25 mg tablet, Take 25 mg by mouth daily, Disp: , Rfl:   •  valsartan (DIOVAN) 320 MG tablet, Take 1 tablet (320 mg total) by mouth daily, Disp: 60 tablet, Rfl: 0    REVIEW OF SYSTEMS     Review of Systems   Constitutional: Negative for activity change and fatigue  HENT: Negative for congestion and ear discharge  Eyes: Negative for photophobia and pain  Respiratory: Positive for shortness of breath  Negative for apnea and choking  Cardiovascular: Positive for leg swelling  Negative for chest pain and palpitations  Gastrointestinal: Positive for abdominal distention  Negative for blood in stool  Endocrine: Negative for heat intolerance and polyphagia  Genitourinary: Negative for flank pain and urgency  Musculoskeletal: Negative for neck pain and neck stiffness  Skin: Negative for color change and wound  Allergic/Immunologic: Negative for food allergies and immunocompromised state  Neurological: Negative for seizures and facial asymmetry  Hematological: Negative for adenopathy  Does not bruise/bleed easily  Psychiatric/Behavioral: Negative for self-injury and suicidal ideas         LAB RESULTS        Results from last 7 days   Lab Units 12/12/22  1318   WBC Thousand/uL 2 80*   HEMOGLOBIN g/dL 9 5*   HEMATOCRIT % 30 9*   PLATELETS Thousands/uL 102*   POTASSIUM mmol/L 5 8*   CHLORIDE mmol/L 97   CO2 mmol/L 27   BUN mg/dL 79*   CREATININE mg/dL 8 29*   EGFR ml/min/1 73sq m 6   CALCIUM mg/dL 8 3       I have personally reviewed the old medical records and patient's previously known baseline creatinine level is ~patient does have ESRD with high creatinine which fluctuate because of dialysis    RADIOLOGY RESULTS     Results for orders placed during the hospital encounter of 11/15/22    XR chest 1 view portable    Narrative  CHEST    INDICATION:   cough  COMPARISON:  None    EXAM PERFORMED/VIEWS:  XR CHEST PORTABLE      FINDINGS:    Cardiomegaly seen  Mild increased lung markings seen No pneumothorax or pleural effusion  Osseous structures appear within normal limits for patient age  Impression  No acute consolidation or congestion    Mild increased lung markings likely due to hypoinflation        Workstation performed: [id number]    No results found for this or any previous visit  No results found for this or any previous visit  No results found for this or any previous visit  Results for orders placed during the hospital encounter of 11/15/22    CT abdomen pelvis wo contrast    Narrative  CT ABDOMEN AND PELVIS WITHOUT IV CONTRAST    INDICATION:   Abdominal pain, acute, nonlocalized  Abd pain and vomiting  COMPARISON:  None  TECHNIQUE:  CT examination of the abdomen and pelvis was performed without intravenous contrast  Axial, sagittal, and coronal 2D reformatted images were created from the source data and submitted for interpretation  Radiation dose length product (DLP) for this visit:  662 mGy-cm   This examination, like all CT scans performed in the [facility], was performed utilizing techniques to minimize radiation dose exposure, including the use of iterative  reconstruction and automated exposure control  Enteric contrast was administered  FINDINGS:    ABDOMEN    LOWER CHEST:  No clinically significant abnormality identified in the visualized lower chest     LIVER/BILIARY TREE:  Unremarkable  GALLBLADDER:  No calcified gallstones  No pericholecystic inflammatory change  SPLEEN:  The spleen is enlarged, measuring  16 2 cm    PANCREAS:  Unremarkable  ADRENAL GLANDS:  Unremarkable  KIDNEYS/URETERS:  Unremarkable  No hydronephrosis      STOMACH AND BOWEL:  There is colonic diverticulosis without evidence of acute diverticulitis  APPENDIX:  No findings to suggest appendicitis  ABDOMINOPELVIC CAVITY:  Mild ascites  No pneumoperitoneum  No lymphadenopathy  VESSELS:  Extensive vessel calcification  PELVIS    REPRODUCTIVE ORGANS:  Unremarkable for patient's age  URINARY BLADDER:  Unremarkable  ABDOMINAL WALL/INGUINAL REGIONS:  Unremarkable  OSSEOUS STRUCTURES:  No acute fracture or destructive osseous lesion  Impression  No evidence of acute intra-abdominal pelvic pathology  Mild ascites  Splenomegaly  Workstation performed: [id number]    No results found for this or any previous visit  OBJECTIVE     Current Weight: Weight - Scale: 86 2 kg (190 lb)  Vitals:    12/12/22 1330   BP: (!) 233/114   Pulse: 77   Resp: 20   Temp:    SpO2: 94%     No intake or output data in the 24 hours ending 12/12/22 1434    PHYSICAL EXAMINATION     Physical Exam  Constitutional:       General: He is not in acute distress  Appearance: He is well-developed  HENT:      Head: Normocephalic  Eyes:      General: No scleral icterus  Conjunctiva/sclera: Conjunctivae normal    Neck:      Vascular: No JVD  Cardiovascular:      Rate and Rhythm: Normal rate  Heart sounds: Normal heart sounds  Pulmonary:      Effort: Pulmonary effort is normal       Breath sounds: Normal breath sounds  No wheezing  Abdominal:      Palpations: Abdomen is soft  Tenderness: There is no abdominal tenderness  Musculoskeletal:         General: Swelling present  Normal range of motion  Cervical back: Neck supple  Skin:     General: Skin is warm  Findings: No rash  Neurological:      Mental Status: He is alert and oriented to person, place, and time  Psychiatric:         Behavior: Behavior normal           PLAN / RECOMMENDATIONS      ESRD: We will dialyze patient today because of volume overload status as well as hyperkalemia    We will dialyze patient tomorrow also which will be his regular day and regular treatment    Volume overload status: Likely because of dietary noncompliance  Discussed with the patient  Also advised to discuss with dietitian as outpatient    Hyperkalemia: Again likely because of noncompliance    Hypertension: May be related to volume and will monitor him with dialysis    Diabetic nephropathy: We will continue to monitor diabetes    Everything discussed length with the patient and the family  We will dialyze him today and tomorrow with low potassium bath today and will monitor him closely    Thank you for the consultation to participate in patient's care  I have personally discussed my plan with the referring physician  Joselin Chavez MD  Nephrology  12/12/2022        Portions of the record may have been created with voice recognition software  Occasional wrong word or "sound a like" substitutions may have occurred due to the inherent limitations of voice recognition software  Read the chart carefully and recognize, using context, where substitutions have occurred

## 2022-12-12 NOTE — ASSESSMENT & PLAN NOTE
Blood Pressure: (!) 223/102    Home medications nifedipine, valsartan, labetalol, clonidine  Missed medications on day of admission  Management under HTV emergency

## 2022-12-12 NOTE — ASSESSMENT & PLAN NOTE
BYRON score of 1    Lab Results   Component Value Date    HSTNI0 20 12/12/2022    HSTNI2 24 12/12/2022    HSTNID2 4 12/12/2022     No ST changes noted on EKG  Likely attributed to volume overload state  Unlikely to be uremic pericarditis given no abnormal EKG findings  Monitor for worsening symptoms; if worsening cp, recheck troponin, EKG stat

## 2022-12-12 NOTE — ASSESSMENT & PLAN NOTE
Lab Results   Component Value Date    EGFR 6 12/12/2022    EGFR 7 11/16/2022    EGFR 9 11/15/2022    CREATININE 8 29 (H) 12/12/2022    CREATININE 7 49 (H) 11/16/2022    CREATININE 6 45 (H) 11/15/2022     ESRD on HD Tuesday Thursday Saturday  Presenting for session of dialysis today followed by additional session of dialysis  tomorrow as per recommendations from outpatient nephrology  Nephrology consulted for dialysis

## 2022-12-12 NOTE — ED PROVIDER NOTES
History  Chief Complaint   Patient presents with   • Chest Pain   • Shortness of Breath     Pt reports CP and SOB that began yesterday  50 y o   male  Patient presents with:  Chest Pain  Shortness of Breath: Pt reports CP and SOB that began yesterday  Past Medical History:  No date: Hypertension  No date: Renal disorder Active Ambulatory Problems    ESRD (end stage renal disease) ([place] [address] )         Date Noted: 11/15/2022      Hypertensive urgency         Date Noted: 11/15/2022      CHF (congestive heart failure) ([place] [address] )         Date Noted: 11/15/2022      T2DM (type 2 diabetes mellitus) ([place] [address] )         Date Noted: 11/15/2022      Elevated troponin         Date Noted: 11/15/2022      Cough         Date Noted: 11/15/2022      Gastroparesis         Date Noted: 11/15/2022    Resolved Ambulatory Problems  No Resolved Ambulatory Problems  Past Medical History:  No date: Hypertension  No date: Renal disorder         50year old male pt comes in to the ED with cc of chest pain with abdominal fullness and radiation to the back    History provided by:  Patient   used: No    Chest Pain  Pain location:  Substernal area  Pain quality: aching    Pain radiates to:  Does not radiate  Pain severity:  Mild  Onset quality:  Gradual  Timing:  Constant  Progression:  Worsening  Chronicity:  New  Relieved by:  Nothing  Worsened by:  Nothing tried  Ineffective treatments:  None tried  Associated symptoms: shortness of breath    Associated symptoms: no AICD problem and no anxiety    Shortness of Breath  Associated symptoms: chest pain        Prior to Admission Medications   Prescriptions Last Dose Informant Patient Reported? Taking?    Albuterol Sulfate 108 (90 Base) MCG/ACT AEPB   Yes No   Sig: Inhale 2 puffs   NIFEdipine ER (ADALAT CC) 60 MG 24 hr tablet   No No   Sig: Take 1 tablet (60 mg total) by mouth 2 (two) times a day   atorvastatin (LIPITOR) 80 mg tablet   No No   Sig: Take 1 tablet (80 mg total) by mouth daily   calcium acetate (CALPHRON) 667 mg   Yes No   Sig: Take by mouth 2 (two) times a day as needed (With snacks)   cloNIDine (CATAPRES-TTS-3) 0 3 mg/24 hr   No No   Sig: Place 1 patch (0 3 mg total) on the skin once a week   dicyclomine (BENTYL) 10 mg capsule   No No   Sig: Take 1 capsule (10 mg total) by mouth 4 (four) times a day (before meals and at bedtime)   dorzolamide-timolol (COSOPT) 22 3-6 8 MG/ML ophthalmic solution   Yes No   Sig: Administer 1 drop to both eyes 2 (two) times a day   ergocalciferol (ERGOCALCIFEROL) 1 25 MG (91682 UT) capsule   Yes No   Sig: Take 50,000 Units by mouth every 7 days   furosemide (LASIX) 80 mg tablet   No No   Sig: Take 1 tablet (80 mg total) by mouth 2 (two) times a day   insulin glargine (LANTUS) 100 units/mL subcutaneous injection   Yes No   Sig: Inject 10 Units under the skin Daily   isosorbide mononitrate (IMDUR) 30 mg 24 hr tablet   No No   Sig: Take 1 tablet (30 mg total) by mouth daily   labetalol (NORMODYNE) 200 mg tablet   No No   Sig: Take 3 tablets (600 mg total) by mouth 2 (two) times a day   metoclopramide (Reglan) 10 mg tablet   No No   Sig: Take 1 tablet (10 mg total) by mouth 4 (four) times a day   minoxidil (LONITEN) 2 5 mg tablet   No No   Sig: Take 1 tablet (2 5 mg total) by mouth 2 (two) times a day   ondansetron (ZOFRAN-ODT) 4 mg disintegrating tablet   Yes No   Sig: Take 4 mg by mouth every 6 (six) hours as needed for nausea or vomiting   pantoprazole (PROTONIX) 40 mg tablet   No No   Sig: Take 1 tablet (40 mg total) by mouth 2 (two) times a day   sitaGLIPtin (JANUVIA) 25 mg tablet   Yes No   Sig: Take 25 mg by mouth daily   valsartan (DIOVAN) 320 MG tablet   No No   Sig: Take 1 tablet (320 mg total) by mouth daily      Facility-Administered Medications: None       Past Medical History:   Diagnosis Date   • Hypertension    • Renal disorder        History reviewed  No pertinent surgical history  History reviewed  No pertinent family history    I have reviewed and agree with the history as documented  E-Cigarette/Vaping   • E-Cigarette Use Never User      E-Cigarette/Vaping Substances     Social History     Tobacco Use   • Smoking status: Never   • Smokeless tobacco: Never   Vaping Use   • Vaping Use: Never used       Review of Systems   Respiratory: Positive for shortness of breath  Cardiovascular: Positive for chest pain  All other systems reviewed and are negative  Physical Exam  Physical Exam  Vitals and nursing note reviewed  Constitutional:       General: He is not in acute distress  Appearance: He is well-developed  He is not diaphoretic  HENT:      Head: Normocephalic and atraumatic  Right Ear: External ear normal       Left Ear: External ear normal    Eyes:      General: No scleral icterus  Right eye: No discharge  Left eye: No discharge  Conjunctiva/sclera: Conjunctivae normal    Neck:      Thyroid: No thyromegaly  Vascular: No JVD  Trachea: No tracheal deviation  Cardiovascular:      Rate and Rhythm: Normal rate and regular rhythm  Pulmonary:      Effort: Pulmonary effort is normal  No respiratory distress  Breath sounds: Normal breath sounds  No stridor  No wheezing or rales  Abdominal:      General: Bowel sounds are normal  There is no distension  Palpations: Abdomen is soft  Tenderness: There is no abdominal tenderness  Musculoskeletal:         General: No tenderness or deformity  Normal range of motion  Cervical back: Normal range of motion and neck supple  Skin:     General: Skin is warm and dry  Neurological:      Mental Status: He is alert and oriented to person, place, and time  Cranial Nerves: No cranial nerve deficit        Coordination: Coordination normal    Psychiatric:         Behavior: Behavior normal          Vital Signs  ED Triage Vitals   Temperature Pulse Respirations Blood Pressure SpO2   12/12/22 1215 12/12/22 1215 12/12/22 1215 12/12/22 1218 12/12/22 1215   98 7 °F (37 1 °C) 85 22 (!) 212/93 95 %      Temp Source Heart Rate Source Patient Position - Orthostatic VS BP Location FiO2 (%)   12/12/22 1215 12/12/22 1215 12/12/22 1215 12/12/22 1215 --   Oral Monitor Sitting Left arm       Pain Score       12/12/22 1215       5           Vitals:    12/12/22 1218 12/12/22 1330 12/12/22 1607 12/12/22 1723   BP: (!) 212/93 (!) 233/114 (!) 245/115 (!) 223/102   Pulse:  77 77 75   Patient Position - Orthostatic VS:  Sitting Sitting Lying         Visual Acuity      ED Medications  Medications   epoetin raquel (EPOGEN,PROCRIT) injection 8,000 Units (has no administration in time range)   albuterol (PROVENTIL HFA,VENTOLIN HFA) inhaler 2 puff (has no administration in time range)   atorvastatin (LIPITOR) tablet 80 mg (80 mg Oral Given 12/12/22 1706)   calcium acetate (PHOSLO) capsule 667 mg (667 mg Oral Given 12/12/22 1753)   cloNIDine (CATAPRES-TTS-3) 0 3 mg/24 hr TD weekly patch (0 3 mg Transdermal Medication Applied 12/12/22 1756)   dicyclomine (BENTYL) capsule 10 mg (10 mg Oral Given 12/12/22 1706)   dorzolamide-timolol (COSOPT) 22 3-6 8 MG/ML ophthalmic solution 1 drop (has no administration in time range)   insulin glargine (LANTUS) subcutaneous injection 10 Units 0 1 mL (has no administration in time range)   isosorbide mononitrate (IMDUR) 24 hr tablet 30 mg (has no administration in time range)   labetalol (NORMODYNE) tablet 600 mg (600 mg Oral Given 12/12/22 1754)   minoxidil (LONITEN) tablet 2 5 mg (2 5 mg Oral Given 12/12/22 1754)   NIFEdipine (PROCARDIA XL) 24 hr tablet 60 mg (has no administration in time range)   pantoprazole (PROTONIX) EC tablet 40 mg (has no administration in time range)   losartan (COZAAR) tablet 100 mg (100 mg Oral Given 12/12/22 6889)   heparin (porcine) subcutaneous injection 5,000 Units (has no administration in time range)   calcium gluconate 1 g in sodium chloride 0 9% 50 mL (premix) (has no administration in time range) insulin regular (HumuLIN R,NovoLIN R) injection 10 Units (has no administration in time range)   HYDROmorphone (DILAUDID) injection 0 5 mg (0 5 mg Intravenous Given 12/12/22 1605)   HYDROmorphone (DILAUDID) injection 1 mg (has no administration in time range)   HYDROmorphone HCl (DILAUDID) injection 0 2 mg (has no administration in time range)   lidocaine (LIDODERM) 5 % patch 1 patch (1 patch Topical Medication Applied 12/12/22 1706)   hydrALAZINE (APRESOLINE) injection 10 mg (has no administration in time range)   furosemide (LASIX) tablet 80 mg (has no administration in time range)       Diagnostic Studies  Results Reviewed     Procedure Component Value Units Date/Time    HS Troponin I 4hr [[id number]]  (Normal) Collected: 12/12/22 1720    Lab Status: Final result Specimen: Blood from Arm, Right Updated: 12/12/22 1811     hs TnI 4hr 21 ng/L      Delta 4hr hsTnI 1 ng/L     Fingerstick Glucose (POCT) [[id number]]  (Normal) Collected: 12/12/22 1757    Lab Status: Final result Updated: 12/12/22 1801     POC Glucose 111 mg/dl     HS Troponin I 2hr [[id number]]  (Normal) Collected: 12/12/22 1517    Lab Status: Final result Specimen: Blood from Arm, Right Updated: 12/12/22 1612     hs TnI 2hr 24 ng/L      Delta 2hr hsTnI 4 ng/L     FLU/RSV/COVID - if FLU/RSV clinically relevant [[id number]] Collected: 12/12/22 1530    Lab Status:  In process Specimen: Nares from Nose Updated: 12/12/22 1537    HS Troponin 0hr (reflex protocol) [[id number]]  (Normal) Collected: 12/12/22 1318    Lab Status: Final result Specimen: Blood from Arm, Right Updated: 12/12/22 1423     hs TnI 0hr 20 ng/L     Comprehensive metabolic panel [[id number]]  (Abnormal) Collected: 12/12/22 1318    Lab Status: Final result Specimen: Blood from Arm, Right Updated: 12/12/22 1400     Sodium 139 mmol/L      Potassium 5 8 mmol/L      Chloride 97 mmol/L      CO2 27 mmol/L      ANION GAP 15 mmol/L      BUN 79 mg/dL      Creatinine 8 29 mg/dL      Glucose 206 mg/dL Calcium 8 3 mg/dL      AST 25 U/L      ALT 24 U/L      Alkaline Phosphatase 56 U/L      Total Protein 7 4 g/dL      Albumin 4 2 g/dL      Total Bilirubin 0 67 mg/dL      eGFR 6 ml/min/1 73sq m     Narrative:      National Kid[employer] Disease Foundation guidelines for Chronic Kidney Disease (CKD):   •  Stage 1 with normal or high GFR (GFR > 90 mL/min/1 73 square meters)  •  Stage 2 Mild CKD (GFR = 60-89 mL/min/1 73 square meters)  •  Stage 3A Moderate CKD (GFR = 45-59 mL/min/1 73 square meters)  •  Stage 3B Moderate CKD (GFR = 30-44 mL/min/1 73 square meters)  •  Stage 4 Severe CKD (GFR = 15-29 mL/min/1 73 square meters)  •  Stage 5 End Stage CKD (GFR <15 mL/min/1 73 square meters)  Note: GFR calculation is accurate only with a steady state creatinine    Lipase [[id number]]  (Normal) Collected: 12/12/22 1318    Lab Status: Final result Specimen: Blood from Arm, Right Updated: 12/12/22 1400     Lipase 128 u/L     Ammonia [[id number]]  (Normal) Collected: 12/12/22 1318    Lab Status: Final result Specimen: Blood from Arm, Right Updated: 12/12/22 1348     Ammonia 23 umol/L     Protime-INR [[id number]]  (Abnormal) Collected: 12/12/22 1318    Lab Status: Final result Specimen: Blood from Arm, Right Updated: 12/12/22 1345     Protime 16 1 seconds      INR 1 31    CBC and differential [[id number]]  (Abnormal) Collected: 12/12/22 1318    Lab Status: Final result Specimen: Blood from Arm, Right Updated: 12/12/22 1329     WBC 2 80 Thousand/uL      RBC 3 06 Million/uL      Hemoglobin 9 5 g/dL      Hematocrit 30 9 %       fL      MCH 31 0 pg      MCHC 30 7 g/dL      RDW 14 9 %      MPV 10 9 fL      Platelets 879 Thousands/uL      nRBC 0 /100 WBCs      Neutrophils Relative 72 %      Immat GRANS % 0 %      Lymphocytes Relative 14 %      Monocytes Relative 8 %      Eosinophils Relative 5 %      Basophils Relative 1 %      Neutrophils Absolute 2 00 Thousands/µL      Immature Grans Absolute 0 01 Thousand/uL      Lymphocytes Absolute 0 39 Thousands/µL      Monocytes Absolute 0 22 Thousand/µL      Eosinophils Absolute 0 14 Thousand/µL      Basophils Absolute 0 04 Thousands/µL     NT-BNP PRO [[id number]] Collected: 12/12/22 1318    Lab Status: In process Specimen: Blood from Arm, Right Updated: 12/12/22 1325                 CT chest abdomen pelvis wo contrast   Final Result by Tatianna Serna MD (12/12 6243)      CT CHEST:      Multiple, somewhat irregular, linear and tubular subpleural opacities in both lungs, most most pronounced anteriorly in the right middle and left upper lobes  These may be related to areas of scarring/atelectasis, or possibly pneumonia  Mild mosaic attenuation of the lung parenchyma, which may be seen in the setting of small vessel or small airways disease  Some areas of interlobular septal thickening suggest mild pulmonary edema  Mild mediastinal lymphadenopathy, possibly reactive  Moderate cardiomegaly  Trace pericardial effusion  Main pulmonary artery dilated up to 3 5 cm, suggestive of pulmonary arterial hypertension  CT ABDOMEN/PELVIS:      No definite evidence of acute abdominal or pelvic pathology within limitation of a noncontrast study  Small abdominal and pelvic ascites, slightly increased compared to 1/15/2022  No organized collections to suggest an abscess  Hepatosplenomegaly  Anasarca  Multiple additional findings as above  The study was marked in [facility] for immediate notification         Workstation performed: [id number]                    Procedures  Procedures         ED Course                                 BYRON Risk Score    Flowsheet Row Most Recent Value   Age >= 72 0 Filed at: 12/12/2022 1815   Known CAD (stenosis >= 50%) 0 Filed at: 12/12/2022 1815   Recent (<=24 hrs) Service Angina 1 Filed at: 12/12/2022 1815   ST Deviation >= 0 5 mm 0 Filed at: 12/12/2022 1815   3+ CAD Risk Factors (FHx, HTN, HLP, DM, Smoker) 0 Filed at: 12/12/2022 1815 Aspirin Use Past 7 Days 0 Filed at: 12/12/2022 1815   Elevated Cardiac Markers 0 Filed at: 12/12/2022 1815   BYRON Risk Score (Calculated) 1 Filed at: 12/12/2022 1815                  [facility]  Number of Diagnoses or Management Options  ESRD (end stage renal disease) ([place] [address] ): new and requires workup     Amount and/or Complexity of Data Reviewed  Clinical lab tests: ordered and reviewed  Discuss the patient with other providers: yes    Patient Progress  Patient progress: stable      Disposition  Final diagnoses:   ESRD (end stage renal disease) ([place] [address] )     Time reflects when diagnosis was documented in both MDM as applicable and the Disposition within this note     Time User Action Codes Description Comment    12/12/2022  2:23 PM Fortino Johnson Add [N18 6] ESRD (end stage renal disease) ([place] [address] )     12/12/2022  3:43 PM Nasim Harvey Add [I16 0] Hypertensive urgency       ED Disposition     ED Disposition   Admit    Condition   Stable    Date/Time   Mon Dec 12, 2022  2:51 PM    Comment   Case was discussed with Dr Diane Velasquez  and the patient's admission status was agreed to be Admission Status: inpatient status to the service of Dr Diane Velasquez    Follow-up Information    None         Patient's Medications   Discharge Prescriptions    No medications on file       No discharge procedures on file      PDMP Review     None          ED Provider  Electronically Signed by           Peg Powell DO  12/12/22 2838

## 2022-12-12 NOTE — ASSESSMENT & PLAN NOTE
Complains of bilateral thoracic back pain  Has a history of lumbar disc disease    Lidoderm patch  Pain management regimen with IV medications

## 2022-12-13 ENCOUNTER — APPOINTMENT (OUTPATIENT)
Dept: DIALYSIS | Facility: HOSPITAL | Age: 47
End: 2022-12-13
Attending: INTERNAL MEDICINE

## 2022-12-13 LAB
ALBUMIN SERPL BCP-MCNC: 4.1 G/DL (ref 3.5–5)
ALP SERPL-CCNC: 58 U/L (ref 46–116)
ALT SERPL W P-5'-P-CCNC: 22 U/L (ref 12–78)
ANION GAP SERPL CALCULATED.3IONS-SCNC: 15 MMOL/L (ref 4–13)
AST SERPL W P-5'-P-CCNC: 19 U/L (ref 5–45)
BASOPHILS # BLD AUTO: 0.03 THOUSANDS/ÂΜL (ref 0–0.1)
BASOPHILS NFR BLD AUTO: 1 % (ref 0–1)
BILIRUB SERPL-MCNC: 0.66 MG/DL (ref 0.2–1)
BUN SERPL-MCNC: 60 MG/DL (ref 5–25)
CALCIUM SERPL-MCNC: 8.8 MG/DL (ref 8.3–10.1)
CHLORIDE SERPL-SCNC: 97 MMOL/L (ref 96–108)
CO2 SERPL-SCNC: 32 MMOL/L (ref 21–32)
CREAT SERPL-MCNC: 6.94 MG/DL (ref 0.6–1.3)
EOSINOPHIL # BLD AUTO: 0.12 THOUSAND/ÂΜL (ref 0–0.61)
EOSINOPHIL NFR BLD AUTO: 4 % (ref 0–6)
ERYTHROCYTE [DISTWIDTH] IN BLOOD BY AUTOMATED COUNT: 14.9 % (ref 11.6–15.1)
GFR SERPL CREATININE-BSD FRML MDRD: 8 ML/MIN/1.73SQ M
GLUCOSE SERPL-MCNC: 135 MG/DL (ref 65–140)
GLUCOSE SERPL-MCNC: 147 MG/DL (ref 65–140)
HCT VFR BLD AUTO: 30.9 % (ref 36.5–49.3)
HGB BLD-MCNC: 9.4 G/DL (ref 12–17)
IMM GRANULOCYTES # BLD AUTO: 0.01 THOUSAND/UL (ref 0–0.2)
IMM GRANULOCYTES NFR BLD AUTO: 0 % (ref 0–2)
LYMPHOCYTES # BLD AUTO: 0.48 THOUSANDS/ÂΜL (ref 0.6–4.47)
LYMPHOCYTES NFR BLD AUTO: 15 % (ref 14–44)
MAGNESIUM SERPL-MCNC: 2.5 MG/DL (ref 1.6–2.6)
MCH RBC QN AUTO: 30.6 PG (ref 26.8–34.3)
MCHC RBC AUTO-ENTMCNC: 30.4 G/DL (ref 31.4–37.4)
MCV RBC AUTO: 101 FL (ref 82–98)
MONOCYTES # BLD AUTO: 0.31 THOUSAND/ÂΜL (ref 0.17–1.22)
MONOCYTES NFR BLD AUTO: 10 % (ref 4–12)
NEUTROPHILS # BLD AUTO: 2.18 THOUSANDS/ÂΜL (ref 1.85–7.62)
NEUTS SEG NFR BLD AUTO: 70 % (ref 43–75)
NRBC BLD AUTO-RTO: 0 /100 WBCS
PLATELET # BLD AUTO: 99 THOUSANDS/UL (ref 149–390)
PMV BLD AUTO: 11 FL (ref 8.9–12.7)
POTASSIUM SERPL-SCNC: 4.6 MMOL/L (ref 3.5–5.3)
PROT SERPL-MCNC: 7.3 G/DL (ref 6.4–8.4)
RBC # BLD AUTO: 3.07 MILLION/UL (ref 3.88–5.62)
SODIUM SERPL-SCNC: 144 MMOL/L (ref 135–147)
WBC # BLD AUTO: 3.13 THOUSAND/UL (ref 4.31–10.16)

## 2022-12-13 RX ORDER — LIDOCAINE 50 MG/G
1 PATCH TOPICAL DAILY
Qty: 10 PATCH | Refills: 0 | Status: SHIPPED | OUTPATIENT
Start: 2022-12-14 | End: 2022-12-24

## 2022-12-13 RX ORDER — GUAIFENESIN/DEXTROMETHORPHAN 100-10MG/5
10 SYRUP ORAL EVERY 4 HOURS PRN
Status: DISCONTINUED | OUTPATIENT
Start: 2022-12-13 | End: 2022-12-14 | Stop reason: HOSPADM

## 2022-12-13 RX ORDER — ONDANSETRON 2 MG/ML
4 INJECTION INTRAMUSCULAR; INTRAVENOUS EVERY 4 HOURS PRN
Status: DISCONTINUED | OUTPATIENT
Start: 2022-12-13 | End: 2022-12-14 | Stop reason: HOSPADM

## 2022-12-13 RX ORDER — GUAIFENESIN/DEXTROMETHORPHAN 100-10MG/5
10 SYRUP ORAL EVERY 4 HOURS PRN
Qty: 473 ML | Refills: 0 | Status: SHIPPED | OUTPATIENT
Start: 2022-12-13 | End: 2022-12-23

## 2022-12-13 RX ADMIN — DICYCLOMINE HYDROCHLORIDE 10 MG: 10 CAPSULE ORAL at 12:22

## 2022-12-13 RX ADMIN — HYDRALAZINE HYDROCHLORIDE 10 MG: 20 INJECTION INTRAMUSCULAR; INTRAVENOUS at 13:06

## 2022-12-13 RX ADMIN — EPOETIN ALFA 8000 UNITS: 4000 SOLUTION INTRAVENOUS; SUBCUTANEOUS at 09:57

## 2022-12-13 RX ADMIN — ALBUTEROL SULFATE 2 PUFF: 90 AEROSOL, METERED RESPIRATORY (INHALATION) at 05:22

## 2022-12-13 RX ADMIN — CALCIUM ACETATE 667 MG: 667 CAPSULE ORAL at 06:14

## 2022-12-13 RX ADMIN — HYDROMORPHONE HYDROCHLORIDE 0.5 MG: 1 INJECTION, SOLUTION INTRAMUSCULAR; INTRAVENOUS; SUBCUTANEOUS at 13:10

## 2022-12-13 RX ADMIN — DICYCLOMINE HYDROCHLORIDE 10 MG: 10 CAPSULE ORAL at 23:41

## 2022-12-13 RX ADMIN — DICYCLOMINE HYDROCHLORIDE 10 MG: 10 CAPSULE ORAL at 06:14

## 2022-12-13 RX ADMIN — HYDROMORPHONE HYDROCHLORIDE 1 MG: 1 INJECTION, SOLUTION INTRAMUSCULAR; INTRAVENOUS; SUBCUTANEOUS at 17:34

## 2022-12-13 RX ADMIN — PANTOPRAZOLE SODIUM 40 MG: 40 TABLET, DELAYED RELEASE ORAL at 06:14

## 2022-12-13 RX ADMIN — LABETALOL HYDROCHLORIDE 600 MG: 200 TABLET, FILM COATED ORAL at 14:10

## 2022-12-13 RX ADMIN — CALCIUM ACETATE 667 MG: 667 CAPSULE ORAL at 17:33

## 2022-12-13 RX ADMIN — LOSARTAN POTASSIUM 100 MG: 50 TABLET, FILM COATED ORAL at 13:10

## 2022-12-13 RX ADMIN — HYDROMORPHONE HYDROCHLORIDE 0.5 MG: 1 INJECTION, SOLUTION INTRAMUSCULAR; INTRAVENOUS; SUBCUTANEOUS at 23:41

## 2022-12-13 RX ADMIN — DORZOLAMIDE HYDROCHLORIDE AND TIMOLOL MALEATE 1 DROP: 22.3; 6.8 SOLUTION/ DROPS OPHTHALMIC at 12:23

## 2022-12-13 RX ADMIN — LIDOCAINE 5% 1 PATCH: 700 PATCH TOPICAL at 13:05

## 2022-12-13 RX ADMIN — GUAIFENESIN AND DEXTROMETHORPHAN 10 ML: 100; 10 SYRUP ORAL at 14:09

## 2022-12-13 RX ADMIN — NIFEDIPINE 60 MG: 60 TABLET, FILM COATED, EXTENDED RELEASE ORAL at 13:20

## 2022-12-13 RX ADMIN — MINOXIDIL 2.5 MG: 2.5 TABLET ORAL at 13:20

## 2022-12-13 RX ADMIN — DORZOLAMIDE HYDROCHLORIDE AND TIMOLOL MALEATE 1 DROP: 22.3; 6.8 SOLUTION/ DROPS OPHTHALMIC at 17:34

## 2022-12-13 RX ADMIN — ONDANSETRON 4 MG: 2 INJECTION INTRAMUSCULAR; INTRAVENOUS at 15:58

## 2022-12-13 RX ADMIN — HYDROMORPHONE HYDROCHLORIDE 1 MG: 1 INJECTION, SOLUTION INTRAMUSCULAR; INTRAVENOUS; SUBCUTANEOUS at 05:25

## 2022-12-13 NOTE — DISCHARGE SUMMARY
[address] [facility]  Discharge- Tyron Rosenbaum 1975, 50 y o  male MRN: [id number]  Unit/Bed#: ED 10 Encounter: [id number]  Primary Care Provider: No primary care provider on file  Date and time admitted to hospital: 12/12/2022 12:18 PM    * Hypertensive emergency  Assessment & Plan  · Better controlled now  · Likely exacerbated by missing HD  · Continue current regimen  · Nephrology following  · Now stable for discharge    Other chest pain  Assessment & Plan  BYRON score of 1    Lab Results   Component Value Date    HSTNI0 20 12/12/2022    HSTNI2 24 12/12/2022    HSTNID2 4 12/12/2022     · No ST changes noted on EKG  · Likely attributed to volume overload state  · No further intervention at this time  · Stable for discharge    Chronic bilateral thoracic back pain  Assessment & Plan  · Complains of bilateral thoracic back pain  · Has a history of lumbar disc disease  · Lidoderm patch  · Pain management regimen   · Stable for discharge     Chronic diastolic congestive heart failure (HCC)  Assessment & Plan  · Volume overloaded on presentation in setting of missed HD sessions  · Now status post HD and stable for discharge    [facility]  Assessment & Plan  Lab Results   Component Value Date    EGFR 8 12/13/2022    EGFR 6 12/12/2022    EGFR 7 11/16/2022    CREATININE 6 94 (H) 12/13/2022    CREATININE 8 29 (H) 12/12/2022    CREATININE 7 49 (H) 11/16/2022     · ESRD on HD Tuesday Thursday Saturday  · Status post session of HD today, 3 liters removed  · Continue HD per nephrology schedule   · Stable for discharge        Medical Problems     Resolved Problems  Date Reviewed: 12/12/2022   None       Discharging Physician / Practitioner: Ofelia Roach MD  PCP: No primary care provider on file    Admission Date:   Admission Orders (From admission, onward)     Ordered        12/12/22 1451  Place in Observation  Once                      Discharge Date: 12/13/22    Consultations During Hospital Stay:  [address]  · IP CONSULT TO NEPHROLOGY  ·     Procedures Performed:   · HD    Significant Findings / Test Results:   Principal Problem:    Hypertensive emergency  Active Problems:    ESRD (end stage renal disease) ([address] )    Chronic diastolic congestive heart failure (HCC)    Chronic bilateral thoracic back pain    Hypertension secondary to other renal disorders    Other chest pain  Resolved Problems:    * No resolved hospital problems  *  ·   ·     Incidental Findings:   · See above       Test Results Pending at Discharge (will require follow up):   · none     Outpatient Tests Requested:  · none    Complications:  None     Reason for Admission: hypertensive urgency    Hospital Course:   Lisa Floyd is a 50 y o  male patient who originally presented to the hospital on 12/12/2022 due to hypertensive urgency secondary to missed HD session now stable for discharge after HD session  Condition at Discharge: good    Discharge Day Visit / Exam:   * Please refer to separate progress note for these details *    Discussion with Family: Patient declined call to   Discharge instructions/Information to patient and family:   See after visit summary for information provided to patient and family  Provisions for Follow-Up Care:  See after visit summary for information related to follow-up care and any pertinent home health orders  Disposition:   Home    Planned Readmission: none      Discharge Statement:  I spent 30+ minutes discharging the patient  This time was spent on the day of discharge  I had direct contact with the patient on the day of discharge  Greater than 50% of the total time was spent examining patient, answering all patient questions, arranging and discussing plan of care with patient as well as directly providing post-discharge instructions  Additional time then spent on discharge activities      Discharge Medications:  See after visit summary for reconciled discharge medications provided to patient and/or family        **Please Note: This note may have been constructed using a voice recognition system**

## 2022-12-13 NOTE — PROGRESS NOTES
HEMODIALYSIS ROUNDING NOTE    Patient: Bibiana Patino               Sex: male          DOA: 12/12/2022 12:18 PM   YOB: 1975        Age:  50 y o         LOS:  LOS: 0 days             SUBJECTIVE     - Patient was seen during hemodialysis today  - Reviewed last 24 hrs events    Patient seen on dialysis    Tolerating quite well    Breathing much better    Denies any chest pain no palpitation    No nausea no vomiting    CURRENT MEDICATIONS       Current Facility-Administered Medications:   •  albuterol (PROVENTIL HFA,VENTOLIN HFA) inhaler 2 puff, 2 puff, Inhalation, Q4H PRN, Diego Montenegro DO, 2 puff at 12/13/22 0522  •  atorvastatin (LIPITOR) tablet 80 mg, 80 mg, Oral, Daily, Diego Montenegro DO, 80 mg at 12/12/22 1706  •  calcium acetate (PHOSLO) capsule 667 mg, 667 mg, Oral, BID AC, Diego Montenegro DO, 667 mg at 12/13/22 0614  •  calcium gluconate 1 g in sodium chloride 0 9% 50 mL (premix), 1 g, Intravenous, Once, Diego Montenegro DO  •  cloNIDine (CATAPRES-TTS-3) 0 3 mg/24 hr TD weekly patch, 1 patch, Transdermal, Weekly, Diego Montenegro DO, 0 3 mg at 12/12/22 1756  •  dicyclomine (BENTYL) capsule 10 mg, 10 mg, Oral, 4x Daily (AC & HS), Diego Montenegro DO, 10 mg at 12/13/22 4205  •  dorzolamide-timolol (COSOPT) 22 3-6 8 MG/ML ophthalmic solution 1 drop, 1 drop, Both Eyes, BID, Diego Montenegro DO, 1 drop at 12/12/22 2147  •  epoetin raquel (EPOGEN,PROCRIT) injection 8,000 Units, 8,000 Units, Intravenous, Once per day on Tue Thu Sat, Diego Montenegro DO  •  furosemide (LASIX) tablet 80 mg, 80 mg, Oral, Daily, Diego Montenegro DO  •  heparin (porcine) subcutaneous injection 5,000 Units, 5,000 Units, Subcutaneous, Q8H [facility], Diego Montenegro DO  •  hydrALAZINE (APRESOLINE) injection 10 mg, 10 mg, Intravenous, Q6H PRN, Diego Montenegro DO, 10 mg at 12/12/22 5756  •  HYDROmorphone (DILAUDID) injection 0 5 mg, 0 5 mg, Intravenous, Q6H PRN, Diego Montenegro DO, 0 5 mg at 12/12/22 2152  •  HYDROmorphone (DILAUDID) injection 1 mg, 1 mg, Intravenous, Q6H PRN, Diego Parameswaran, DO, 1 mg at 12/13/22 1510  •  HYDROmorphone HCl (DILAUDID) injection 0 2 mg, 0 2 mg, Intravenous, Q3H PRN, Diego Lockeswaran, DO, 0 2 mg at 12/12/22 2336  •  insulin glargine (LANTUS) subcutaneous injection 10 Units 0 1 mL, 10 Units, Subcutaneous, HS, Diego Parameswaran, DO  •  insulin regular (HumuLIN R,NovoLIN R) injection 10 Units, 10 Units, Intravenous, Once, Diego Ashvineswaran, DO  •  isosorbide mononitrate (IMDUR) 24 hr tablet 30 mg, 30 mg, Oral, Daily, Diego Parameswaran, DO, 30 mg at 12/12/22 2146  •  labetalol (NORMODYNE) tablet 600 mg, 600 mg, Oral, BID, Diego Parameswaran, DO, 600 mg at 12/12/22 1754  •  lidocaine (LIDODERM) 5 % patch 1 patch, 1 patch, Topical, Daily, Diego Parameswaran, DO, 1 patch at 12/12/22 1706  •  losartan (COZAAR) tablet 100 mg, 100 mg, Oral, Daily, Diego Parameswaran, DO, 100 mg at 12/12/22 1723  •  minoxidil (LONITEN) tablet 2 5 mg, 2 5 mg, Oral, BID, Diego Parameswaran, DO, 2 5 mg at 12/12/22 1754  •  NIFEdipine (PROCARDIA XL) 24 hr tablet 60 mg, 60 mg, Oral, BID, Diego Parameswaran, DO, 60 mg at 12/12/22 2211  •  pantoprazole (PROTONIX) EC tablet 40 mg, 40 mg, Oral, Early Morning, Diego Parameswaran, DO, 40 mg at 12/13/22 3169    Current Outpatient Medications:   •  Albuterol Sulfate 108 (90 Base) MCG/ACT AEPB, Inhale 2 puffs, Disp: , Rfl:   •  atorvastatin (LIPITOR) 80 mg tablet, Take 1 tablet (80 mg total) by mouth daily, Disp: 60 tablet, Rfl: 0  •  calcium acetate (CALPHRON) 667 mg, Take by mouth 2 (two) times a day as needed (With snacks), Disp: , Rfl:   •  cloNIDine (CATAPRES-TTS-3) 0 3 mg/24 hr, Place 1 patch (0 3 mg total) on the skin once a week, Disp: 8 patch, Rfl: 0  •  dicyclomine (BENTYL) 10 mg capsule, Take 1 capsule (10 mg total) by mouth 4 (four) times a day (before meals and at bedtime), Disp: 240 capsule, Rfl: 0  •  dorzolamide-timolol (COSOPT) 22 3-6 8 MG/ML ophthalmic solution, Administer 1 drop to both eyes 2 (two) times a day, Disp: , Rfl:   •  ergocalciferol (ERGOCALCIFEROL) 1 25 MG (70236 UT) capsule, Take 50,000 Units by mouth every 7 days, Disp: , Rfl:   •  isosorbide mononitrate (IMDUR) 30 mg 24 hr tablet, Take 1 tablet (30 mg total) by mouth daily, Disp: 60 tablet, Rfl: 0  •  labetalol (NORMODYNE) 200 mg tablet, Take 3 tablets (600 mg total) by mouth 2 (two) times a day, Disp: 360 tablet, Rfl: 0  •  metoclopramide (Reglan) 10 mg tablet, Take 1 tablet (10 mg total) by mouth 4 (four) times a day, Disp: 240 tablet, Rfl: 0  •  minoxidil (LONITEN) 2 5 mg tablet, Take 1 tablet (2 5 mg total) by mouth 2 (two) times a day, Disp: 120 tablet, Rfl: 0  •  NIFEdipine ER (ADALAT CC) 60 MG 24 hr tablet, Take 1 tablet (60 mg total) by mouth 2 (two) times a day, Disp: 120 tablet, Rfl: 0  •  pantoprazole (PROTONIX) 40 mg tablet, Take 1 tablet (40 mg total) by mouth 2 (two) times a day, Disp: 60 tablet, Rfl: 3  •  valsartan (DIOVAN) 320 MG tablet, Take 1 tablet (320 mg total) by mouth daily, Disp: 60 tablet, Rfl: 0  •  furosemide (LASIX) 80 mg tablet, Take 1 tablet (80 mg total) by mouth 2 (two) times a day (Patient not taking: Reported on 12/12/2022), Disp: 120 tablet, Rfl: 0  •  insulin glargine (LANTUS) 100 units/mL subcutaneous injection, Inject 10 Units under the skin Daily (Patient not taking: Reported on 12/12/2022), Disp: , Rfl:   •  ondansetron (ZOFRAN-ODT) 4 mg disintegrating tablet, Take 4 mg by mouth every 6 (six) hours as needed for nausea or vomiting, Disp: , Rfl:   •  sitaGLIPtin (JANUVIA) 25 mg tablet, Take 25 mg by mouth daily (Patient not taking: Reported on 12/12/2022), Disp: , Rfl:     OBJECTIVE     Current Weight: Weight - Scale: 86 2 kg (190 lb)  Vitals:    12/13/22 0930   BP: 163/74   Pulse: 78   Resp: 18   Temp:    SpO2:        Intake/Output Summary (Last 24 hours) at 12/13/2022 0942  Last data filed at 12/13/2022 0815  Gross per 24 hour   Intake 1100 ml   Output 2700 ml   Net -1600 ml       PHYSICAL EXAMINATION     Physical Exam  Constitutional:       General: He is not in acute distress  Appearance: He is well-developed  HENT:      Head: Normocephalic  Eyes:      General: No scleral icterus  Conjunctiva/sclera: Conjunctivae normal    Neck:      Vascular: No JVD  Cardiovascular:      Rate and Rhythm: Normal rate  Heart sounds: Normal heart sounds  Pulmonary:      Effort: Pulmonary effort is normal       Breath sounds: No wheezing  Abdominal:      Palpations: Abdomen is soft  Tenderness: There is no abdominal tenderness  Musculoskeletal:         General: Normal range of motion  Cervical back: Neck supple  Skin:     General: Skin is warm  Findings: No rash  Neurological:      Mental Status: He is alert and oriented to person, place, and time  Psychiatric:         Behavior: Behavior normal            LAB RESULTS     Results from last 7 days   Lab Units 12/13/22  0527 12/12/22  1318   WBC Thousand/uL 3 13* 2 80*   HEMOGLOBIN g/dL 9 4* 9 5*   HEMATOCRIT % 30 9* 30 9*   PLATELETS Thousands/uL 99* 102*   POTASSIUM mmol/L 4 6 5 8*   CHLORIDE mmol/L 97 97   CO2 mmol/L 32 27   BUN mg/dL 60* 79*   CREATININE mg/dL 6 94* 8 29*   EGFR ml/min/1 73sq m 8 6   CALCIUM mg/dL 8 8 8 3   MAGNESIUM mg/dL 2 5  --        RADIOLOGY RESULTS     Results for orders placed during the hospital encounter of 11/15/22    XR chest 1 view portable    Narrative  CHEST    INDICATION:   cough  COMPARISON:  None    EXAM PERFORMED/VIEWS:  XR CHEST PORTABLE      FINDINGS:    Cardiomegaly seen  Mild increased lung markings seen No pneumothorax or pleural effusion  Osseous structures appear within normal limits for patient age  Impression  No acute consolidation or congestion    Mild increased lung markings likely due to hypoinflation        Workstation performed: [id number]    No results found for this or any previous visit      No results found for this or any previous visit  No results found for this or any previous visit  Results for orders placed during the hospital encounter of 11/15/22    CT abdomen pelvis wo contrast    Narrative  CT ABDOMEN AND PELVIS WITHOUT IV CONTRAST    INDICATION:   Abdominal pain, acute, nonlocalized  Abd pain and vomiting  COMPARISON:  None  TECHNIQUE:  CT examination of the abdomen and pelvis was performed without intravenous contrast  Axial, sagittal, and coronal 2D reformatted images were created from the source data and submitted for interpretation  Radiation dose length product (DLP) for this visit:  662 mGy-cm   This examination, like all CT scans performed in the [facility], was performed utilizing techniques to minimize radiation dose exposure, including the use of iterative  reconstruction and automated exposure control  Enteric contrast was administered  FINDINGS:    ABDOMEN    LOWER CHEST:  No clinically significant abnormality identified in the visualized lower chest     LIVER/BILIARY TREE:  Unremarkable  GALLBLADDER:  No calcified gallstones  No pericholecystic inflammatory change  SPLEEN:  The spleen is enlarged, measuring  16 2 cm    PANCREAS:  Unremarkable  ADRENAL GLANDS:  Unremarkable  KIDNEYS/URETERS:  Unremarkable  No hydronephrosis  STOMACH AND BOWEL:  There is colonic diverticulosis without evidence of acute diverticulitis  APPENDIX:  No findings to suggest appendicitis  ABDOMINOPELVIC CAVITY:  Mild ascites  No pneumoperitoneum  No lymphadenopathy  VESSELS:  Extensive vessel calcification  PELVIS    REPRODUCTIVE ORGANS:  Unremarkable for patient's age  URINARY BLADDER:  Unremarkable  ABDOMINAL WALL/INGUINAL REGIONS:  Unremarkable  OSSEOUS STRUCTURES:  No acute fracture or destructive osseous lesion  Impression  No evidence of acute intra-abdominal pelvic pathology  Mild ascites  Splenomegaly          Workstation performed: [id number]    No results found for this or any previous visit  PLAN / RECOMMENDATIONS     1  End Stage Renal Disease:       I saw and examined patient during hemodialysis treatment  The patient was receiving hemodialysis for treatment of end stage renal disease  I have also reviewed vital signs, intake and output, lab results and recent events, and agreed with today's dialysis order  Access: No issue    2  Anemia:   Stable with Procrit    3  Volume overload status: Seems doing much better  Will take 3 L off today on dialysis    4  Hypertension also getting better control with volume removal    5  Disposition: Can be discharged renal point of view    Sera Castellanos MD  Nephrology  12/13/2022        Portions of the record may have been created with voice recognition software  Occasional wrong word or "sound a like" substitutions may have occurred due to the inherent limitations of voice recognition software  Read the chart carefully and recognize, using context, where substitutions have occurred

## 2022-12-13 NOTE — ASSESSMENT & PLAN NOTE
· Complains of bilateral thoracic back pain  · Has a history of lumbar disc disease  · Lidoderm patch  · Pain management regimen   · Stable for discharge

## 2022-12-13 NOTE — UTILIZATION REVIEW
Initial Clinical Review    Admission: Date/Time/Statement:   Admission Orders (From admission, onward)     Ordered        12/12/22 1451  Place in Observation  Once                      Orders Placed This Encounter   Procedures   • Place in Observation     Standing Status:   Standing     Number of Occurrences:   1     Order Specific Question:   Level of Care     Answer:   Med Surg [16]     ED Arrival Information     Expected   -    Arrival   12/12/2022 12:11    Acuity   Emergent            Means of arrival   Walk-In    Escorted by   Family Member    Service   Hospitalist    Admission type   Emergency            Arrival complaint   CHEST PAIN           Chief Complaint   Patient presents with   • Chest Pain   • Shortness of Breath     Pt reports CP and SOB that began yesterday  Initial Presentation: 50 y o  male to ED from home w/ volume overload   Patient underwent double session of dialysis on Saturday  Still felt that he was having volume retention specifically in his abdomen  Felt that his abdomen was quite tight and was causing mild difficulty with taking deep breaths  Also c/o CP nonradiating , worse w/ laying flat , mild improvement when leaning forward  /102 on arrival   Admitted OBS status for HTN urgency, ESRD  cont home meds and monitor BP , add hydralazine prn SBP >200  ESRD on T,Th,Sat schedule   Nephrology consulted   CP no ST changes, recheck trop and monitor   CHF give lasix , BB , labetalol , imdur , losartan , nephrology consulted , Na restriction , monitor BMP       12/12 Nephrology Consult   ESRD plan to dialyze today bc of volume overload and hyperkalemia   Plan to dialyze again tomorrow  Volume overload likely bc dietary noncompliance   HTN r/t volume and monitor   12/13 IM Note     12/13 GI Consult   GERD , gastroparesis , abd pain   Patient was actually scheduled this morning for an outpatient gastric emptying study  This will need to be rescheduled   Cont protonix , gastroparesis diet   No plans for any further GI work up at this time        ED Triage Vitals   Temperature Pulse Respirations Blood Pressure SpO2   12/12/22 1215 12/12/22 1215 12/12/22 1215 12/12/22 1218 12/12/22 1215   98 7 °F (37 1 °C) 85 22 (!) 212/93 95 %      Temp Source Heart Rate Source Patient Position - Orthostatic VS BP Location FiO2 (%)   12/12/22 1215 12/12/22 1215 12/12/22 1215 12/12/22 1215 --   Oral Monitor Sitting Left arm       Pain Score       12/12/22 1215       5          Wt Readings from Last 1 Encounters:   12/13/22 87 kg (191 lb 12 8 oz)     Additional Vital Signs:   12/14/22 07:56:51 98 7 °F (37 1 °C) 79 -- 149/83 105 84 % Abnormal  -- -- -- --   12/13/22 23:40:17 99 3 °F (37 4 °C) 71 -- 151/86 108 98 % -- -- -- --   12/13/22 20:44:14 -- -- -- 106/67 80 -- -- -- -- --   12/13/22 20:43:09 -- -- -- 106/67 80 -- -- -- -- --   12/13/22 1848 -- 73 -- -- -- 96 % -- -- -- --   12/13/22 1836 -- 75 -- 132/64 87 -- 32 3 L/min -- --   12/13/22 18:27:05 98 2 °F (36 8 °C) 72 18 132/64 87 90 % 32 3 L/min Nasal cannula Sitting   12/13/22 1558 -- 77 -- 176/73 Abnormal  -- 95 % -- -- None (Room air) Sitting   12/13/22 1432 -- 85 18 170/73 98 91 % -- -- -- --   12/13/22 1406 -- -- -- 182/81 Abnormal  97 --           12/13/22 1306 -- -- -- 218/108 Abnormal  -- -- -- -- -- --   12/13/22 1250 -- 87 18 205/95 Abnormal  -- 96 % -- -- -- Lying   12/13/22 1145 -- 80 18 157/79 -- -- -- -- -- --   12/13/22 1130 -- 80 18 179/97 Abnormal  -- -- -- -- -- --   12/13/22 1100 -- 78 18 165/79 -- -- -- -- -- Lying   12/13/22 1030 -- 79 18 158/88 -- -- -- -- -- Lying   12/13/22 1000 -- 78 18 161/85 -- -- -- -- -- --   12/13/22 0930 -- 78 18 163/74 -- -- -- -- -- --   12/13/22 0900 -- 75 18 160/73 -- -- -- -- -- --   12/13/22 0830 -- 76 18 170/70 -- -- -- -- -- --   12/13/22 0815 98 2 °F (36 8 °C) 79 Abnormal  18 Abnormal  178/92 Abnormal  -- -- -- -- -- --   12/13/22 0749 -- 79 18 162/77 -- 92 % 28            12/13/22 0115 -- 77 -- 155/67 97 92 % -- --   12/12/22 2336 -- -- -- -- -- -- None (Room air) --   12/12/22 2329 97 3 °F (36 3 °C) Abnormal  80 16 240/100 Abnormal  144 93 % None (Room air) --   12/12/22 2211 -- 78 16 -- -- 93 % None (Room air) Lying   12/12/22 2145 -- 79 19 246/110 Abnormal  158 97 % None (Room air) --   12/12/22 2110 98 °F (36 7 °C) 75 18 224/109 Abnormal  -- -- -- --   12/12/22 2100 -- 75 18 -- -- -- -- --   12/12/22 2030 -- 71 15 221/98 Abnormal  -- -- -- --   12/12/22 2000 -- 74 18 207/103 Abnormal  -- -- -- --   12/12/22 1935 -- 74 18 193/101 Abnormal  -- -- -- --   12/12/22 1910 -- 75 18 189/93 Abnormal  -- -- -- --   12/12/22 1900 98 8 °F (37 1 °C) 75 18 193/95 Abnormal  -- -- -- --   12/12/22 1723 -- 75 18 223/102 Abnormal  -- 92 % None (Room air) Lying   12/12/22 1607 -- 77 21 245/115 Abnormal  -- 93 % None (Room air) Sitting   12/12/22 1545 -- -- -- -- -- -- None (Room air) --   12/12/22 1330 -- 77 20 233/114 Abnormal   -- 94 % None (Room air) Sitting   BP: pt reports he did not take any of his prescribed meds today at 12/12/22 1330   12/12/22 1218 -- -- -- 212/93 Abnormal  -- -- --        Pertinent Labs/Diagnostic Test Results:   CT chest abdomen pelvis wo contrast   Final Result by Birgit Huerta MD (12/12 1343)      CT CHEST:      Multiple, somewhat irregular, linear and tubular subpleural opacities in both lungs, most most pronounced anteriorly in the right middle and left upper lobes  These may be related to areas of scarring/atelectasis, or possibly pneumonia  Mild mosaic attenuation of the lung parenchyma, which may be seen in the setting of small vessel or small airways disease  Some areas of interlobular septal thickening suggest mild pulmonary edema  Mild mediastinal lymphadenopathy, possibly reactive  Moderate cardiomegaly  Trace pericardial effusion  Main pulmonary artery dilated up to 3 5 cm, suggestive of pulmonary arterial hypertension         CT ABDOMEN/PELVIS: No definite evidence of acute abdominal or pelvic pathology within limitation of a noncontrast study  Small abdominal and pelvic ascites, slightly increased compared to 1/15/2022  No organized collections to suggest an abscess  Hepatosplenomegaly  Anasarca  Multiple additional findings as above  The study was marked in [facility] for immediate notification         Workstation performed: [id number]           Results from last 7 days   Lab Units 12/12/22  1530   SARS-COV-2  Negative     Results from last 7 days   Lab Units 12/13/22  0527 12/12/22  1318   WBC Thousand/uL 3 13* 2 80*   HEMOGLOBIN g/dL 9 4* 9 5*   HEMATOCRIT % 30 9* 30 9*   PLATELETS Thousands/uL 99* 102*   NEUTROS ABS Thousands/µL 2 18 2 00     Results from last 7 days   Lab Units 12/13/22  0527 12/12/22  1318   SODIUM mmol/L 144 139   POTASSIUM mmol/L 4 6 5 8*   CHLORIDE mmol/L 97 97   CO2 mmol/L 32 27   ANION GAP mmol/L 15* 15*   BUN mg/dL 60* 79*   CREATININE mg/dL 6 94* 8 29*   EGFR ml/min/1 73sq m 8 6   CALCIUM mg/dL 8 8 8 3   MAGNESIUM mg/dL 2 5  --      Results from last 7 days   Lab Units 12/13/22  0527 12/12/22  1318   AST U/L 19 25   ALT U/L 22 24   ALK PHOS U/L 58 56   TOTAL PROTEIN g/dL 7 3 7 4   ALBUMIN g/dL 4 1 4 2   TOTAL BILIRUBIN mg/dL 0 66 0 67   AMMONIA umol/L  --  23     Results from last 7 days   Lab Units 12/13/22  0748 12/12/22  1757   POC GLUCOSE mg/dl 135 111     Results from last 7 days   Lab Units 12/13/22  0527 12/12/22  1318   GLUCOSE RANDOM mg/dL 147* 206*         Results from last 7 days   Lab Units 12/12/22  1720 12/12/22  1517 12/12/22  1318   HS TNI 0HR ng/L  --   --  20   HS TNI 2HR ng/L  --  24  --    HSTNI D2 ng/L  --  4  --    HS TNI 4HR ng/L 21  --   --    HSTNI D4 ng/L 1  --   --      Results from last 7 days   Lab Units 12/12/22  1318   PROTIME seconds 16 1*   INR  1 31*     Results from last 7 days   Lab Units 12/12/22  1318   NT-PRO BNP pg/mL 32,009*     Results from last 7 days   Lab Units 12/12/22  1318   LIPASE u/L 128     Results from last 7 days   Lab Units 12/12/22  1530   INFLUENZA A PCR  Negative   INFLUENZA B PCR  Negative   RSV PCR  Negative         ED Treatment:   Medication Administration from 12/12/2022 1211 to 12/13/2022 0729       Date/Time Order Dose Route Action     12/13/2022 0522 EST albuterol (PROVENTIL HFA,VENTOLIN HFA) inhaler 2 puff 2 puff Inhalation Given     12/12/2022 1706 EST atorvastatin (LIPITOR) tablet 80 mg 80 mg Oral Given     12/13/2022 0614 EST calcium acetate (PHOSLO) capsule 667 mg 667 mg Oral Given     12/12/2022 1753 EST calcium acetate (PHOSLO) capsule 667 mg 667 mg Oral Given     12/12/2022 1756 EST cloNIDine (CATAPRES-TTS-3) 0 3 mg/24 hr TD weekly patch 0 3 mg Transdermal Medication Applied     12/13/2022 0614 EST dicyclomine (BENTYL) capsule 10 mg 10 mg Oral Given     12/12/2022 2153 EST dicyclomine (BENTYL) capsule 10 mg 10 mg Oral Given     12/12/2022 1706 EST dicyclomine (BENTYL) capsule 10 mg 10 mg Oral Given     12/12/2022 2147 EST dorzolamide-timolol (COSOPT) 22 3-6 8 MG/ML ophthalmic solution 1 drop 1 drop Both Eyes Given     12/12/2022 2146 EST isosorbide mononitrate (IMDUR) 24 hr tablet 30 mg 30 mg Oral Given     12/12/2022 1754 EST labetalol (NORMODYNE) tablet 600 mg 600 mg Oral Given     12/12/2022 1754 EST minoxidil (LONITEN) tablet 2 5 mg 2 5 mg Oral Given     12/12/2022 2211 EST NIFEdipine (PROCARDIA XL) 24 hr tablet 60 mg 60 mg Oral Given     12/13/2022 0614 EST pantoprazole (PROTONIX) EC tablet 40 mg 40 mg Oral Given     12/12/2022 1723 EST losartan (COZAAR) tablet 100 mg 100 mg Oral Given     12/12/2022 2152 EST HYDROmorphone (DILAUDID) injection 0 5 mg 0 5 mg Intravenous Given     12/12/2022 1605 EST HYDROmorphone (DILAUDID) injection 0 5 mg 0 5 mg Intravenous Given     12/13/2022 0525 EST HYDROmorphone (DILAUDID) injection 1 mg 1 mg Intravenous Given     12/12/2022 2336 EST HYDROmorphone HCl (DILAUDID) injection 0 2 mg 0 2 mg Intravenous Given     12/13/2022 0529 EST lidocaine (LIDODERM) 5 % patch 1 patch 1 patch Topical Patch Removed     12/12/2022 1706 EST lidocaine (LIDODERM) 5 % patch 1 patch 1 patch Topical Medication Applied     12/12/2022 0227 EST hydrALAZINE (APRESOLINE) injection 10 mg 10 mg Intravenous Given        Past Medical History:   Diagnosis Date   • Hypertension    • Renal disorder      Present on Admission:  • Chronic diastolic congestive heart failure (HCC)  • ESRD (end stage renal disease) ([facility])  • Gastroparesis      Admitting Diagnosis: Gastroparesis [K31 84]  Chest pain [R07 9]  ESRD (end stage renal disease) ([facility] [address] ) [N18 6]  Hypertensive urgency [I16 0]  Cough, unspecified type [R05 9]  Age/Sex: 50 y o  male  Admission Orders:  Scheduled Medications:  atorvastatin, 80 mg, Oral, Daily  calcium acetate, 667 mg, Oral, BID AC  calcium gluconate, 1 g, Intravenous, Once  cloNIDine, 1 patch, Transdermal, Weekly  dicyclomine, 10 mg, Oral, 4x Daily (AC & HS)  dorzolamide-timolol, 1 drop, Both Eyes, BID  epoetin raquel, 8,000 Units, Intravenous, Once per day on Tue Thu Sat  furosemide, 80 mg, Oral, Daily  heparin (porcine), 5,000 Units, Subcutaneous, Q8H [address]  insulin glargine, 10 Units, Subcutaneous, HS  insulin regular, 10 Units, Intravenous, Once  isosorbide mononitrate, 30 mg, Oral, Daily  labetalol, 600 mg, Oral, BID  lidocaine, 1 patch, Topical, Daily  losartan, 100 mg, Oral, Daily  minoxidil, 2 5 mg, Oral, BID  NIFEdipine, 60 mg, Oral, BID  pantoprazole, 40 mg, Oral, Early Morning      Continuous IV Infusions:     PRN Meds:  albuterol, 2 puff, Inhalation, Q4H PRN  hydrALAZINE, 10 mg, Intravenous, Q6H PRN  HYDROmorphone, 0 5 mg, Intravenous, Q6H PRN  12/12  x2  HYDROmorphone, 1 mg, Intravenous, Q6H PRN  12/13  x1  HYDROmorphone, 0 2 mg, Intravenous, Q3H PRN  12/12 x1    Renal restrictive     IP CONSULT TO NEPHROLOGY  IP CONSULT TO NEPHROLOGY  IP CONSULT TO GASTROENTEROLOGY    Network Utilization Review Department  ATTENTION: Please call with any questions or concerns to [phone] and carefully listen to the prompts so that you are directed to the right person  All voicemails are confidential   Medina Reid all requests for admission clinical reviews, approved or denied determinations and any other requests to dedicated fax number below belonging to the campus where the patient is receiving treatment   List of dedicated fax numbers for the Facilities:  [address] DENIALS (Administrative/Medical Necessity) [phone]   [address] (Maternity/NICU/Pediatrics) [phone]   2 Mayte Mendoza [phone]   [facility] [phone]   [address] [address] Priyanka Rojas [facility] Hospital[facility] [phone]   [facility] [address] [facility] [phone]

## 2022-12-13 NOTE — PLAN OF CARE
Post-Dialysis RN Treatment Note    Blood Pressure:  Pre  193/95   mm/Hg       Post       mmHg   EDW    Kg/    Weight:  Pre  91 8 kg   Post     kg   Mode of weight measurement:  standing scale   Volume Removed  2200  ml    Treatment duration  120 minutes    NS give    Treatment shortened? Medications given during Rx NA   Estimated Kt/V  NA   Access type: AVF   Access Issues:  NA   Report called to primary nurse   YES    Jaqueline MONROE            Goal of today's treatment is the removal of 2-3 kg fluid with session today  Correct electrolyte  anomaly with low potassium bath        Problem: METABOLIC, FLUID AND ELECTROLYTES - ADULT  Goal: Electrolytes maintained within normal limits  Description: INTERVENTIONS:  - Monitor labs and assess patient for signs and symptoms of electrolyte imbalances  - Administer electrolyte replacement as ordered  - Monitor response to electrolyte replacements, including repeat lab results as appropriate  - Instruct patient on fluid and nutrition as appropriate  Outcome: Progressing  Goal: Fluid balance maintained  Description: INTERVENTIONS:  - Monitor labs   - Monitor I/O and WT  - Instruct patient on fluid and nutrition as appropriate  - Assess for signs & symptoms of volume excess or deficit  Outcome: Progressing

## 2022-12-13 NOTE — PLAN OF CARE
Post-Dialysis RN Treatment Note    Blood Pressure:  Pre 179/92mm/Hg  Post   188/99  mmHg   EDW  kg    Weight:  Pre   88 6  kg   Post    85 1kg   Mode of weight measurement:    Volume Removed 3500  ml    Treatment duration 210 minutes    NS given     Treatment shortened? No   Medications given during Rx  Epo 8000 U   Estimated Kt/V  NA   Access type: AVF   Access Issues: NA   Report called to primary nurse   Carolina Iniguez 15, RN              Goal of today's treatment is the removal of 3-4 kg during session lasting 3 5 hrs  BFR will be 400/ min for the duration of the treatment    Problem: METABOLIC, FLUID AND ELECTROLYTES - ADULT  Goal: Electrolytes maintained within normal limits  Description: INTERVENTIONS:  - Monitor labs and assess patient for signs and symptoms of electrolyte imbalances  - Administer electrolyte replacement as ordered  - Monitor response to electrolyte replacements, including repeat lab results as appropriate  - Instruct patient on fluid and nutrition as appropriate  12/13/2022 0833 by Maritza Catalan RN  Outcome: Progressing  12/12/2022 1915 by Maritza Catalan RN  Outcome: Progressing  Goal: Fluid balance maintained  Description: INTERVENTIONS:  - Monitor labs   - Monitor I/O and WT  - Instruct patient on fluid and nutrition as appropriate  - Assess for signs & symptoms of volume excess or deficit  12/13/2022 0833 by Maritza Catalan RN  Outcome: Progressing  12/12/2022 1915 by Maritza Catalan RN  Outcome: Progressing

## 2022-12-13 NOTE — ASSESSMENT & PLAN NOTE
Lab Results   Component Value Date    EGFR 8 12/13/2022    EGFR 6 12/12/2022    EGFR 7 11/16/2022    CREATININE 6 94 (H) 12/13/2022    CREATININE 8 29 (H) 12/12/2022    CREATININE 7 49 (H) 11/16/2022     · ESRD on HD Tuesday Thursday Saturday  · Status post session of HD today, 3 liters removed  · Continue HD per nephrology schedule   · Stable for discharge

## 2022-12-13 NOTE — ASSESSMENT & PLAN NOTE
· Better controlled now  · Likely exacerbated by missing HD  · Continue current regimen  · Nephrology following  · Now stable for discharge

## 2022-12-13 NOTE — ASSESSMENT & PLAN NOTE
· Volume overloaded on presentation in setting of missed HD sessions  · Now status post HD and stable for discharge

## 2022-12-13 NOTE — ASSESSMENT & PLAN NOTE
BYRON score of 1    Lab Results   Component Value Date    HSTNI0 20 12/12/2022    HSTNI2 24 12/12/2022    HSTNID2 4 12/12/2022     · No ST changes noted on EKG  · Likely attributed to volume overload state  · No further intervention at this time  · Stable for discharge

## 2022-12-14 VITALS
OXYGEN SATURATION: 90 % | TEMPERATURE: 98.7 F | BODY MASS INDEX: 29.07 KG/M2 | SYSTOLIC BLOOD PRESSURE: 149 MMHG | RESPIRATION RATE: 18 BRPM | DIASTOLIC BLOOD PRESSURE: 83 MMHG | HEIGHT: 68 IN | WEIGHT: 191.8 LBS | HEART RATE: 74 BPM

## 2022-12-14 PROBLEM — R06.02 SHORTNESS OF BREATH: Status: ACTIVE | Noted: 2022-12-14

## 2022-12-14 LAB — PROCALCITONIN SERPL-MCNC: 0.63 NG/ML

## 2022-12-14 RX ORDER — OXYCODONE HYDROCHLORIDE 5 MG/1
5 TABLET ORAL EVERY 6 HOURS PRN
Status: DISCONTINUED | OUTPATIENT
Start: 2022-12-14 | End: 2022-12-14 | Stop reason: HOSPADM

## 2022-12-14 RX ADMIN — MINOXIDIL 2.5 MG: 2.5 TABLET ORAL at 08:54

## 2022-12-14 RX ADMIN — FUROSEMIDE 80 MG: 80 TABLET ORAL at 08:52

## 2022-12-14 RX ADMIN — CALCIUM ACETATE 667 MG: 667 CAPSULE ORAL at 06:39

## 2022-12-14 RX ADMIN — NIFEDIPINE 60 MG: 60 TABLET, FILM COATED, EXTENDED RELEASE ORAL at 08:54

## 2022-12-14 RX ADMIN — DICYCLOMINE HYDROCHLORIDE 10 MG: 10 CAPSULE ORAL at 12:16

## 2022-12-14 RX ADMIN — ALBUTEROL SULFATE 2 PUFF: 90 AEROSOL, METERED RESPIRATORY (INHALATION) at 12:16

## 2022-12-14 RX ADMIN — HYDROMORPHONE HYDROCHLORIDE 0.5 MG: 1 INJECTION, SOLUTION INTRAMUSCULAR; INTRAVENOUS; SUBCUTANEOUS at 06:42

## 2022-12-14 RX ADMIN — DICYCLOMINE HYDROCHLORIDE 10 MG: 10 CAPSULE ORAL at 06:39

## 2022-12-14 RX ADMIN — DORZOLAMIDE HYDROCHLORIDE AND TIMOLOL MALEATE 1 DROP: 22.3; 6.8 SOLUTION/ DROPS OPHTHALMIC at 08:55

## 2022-12-14 RX ADMIN — OXYCODONE HYDROCHLORIDE 5 MG: 5 TABLET ORAL at 12:16

## 2022-12-14 RX ADMIN — ATORVASTATIN CALCIUM 80 MG: 40 TABLET, FILM COATED ORAL at 08:52

## 2022-12-14 RX ADMIN — PANTOPRAZOLE SODIUM 40 MG: 40 TABLET, DELAYED RELEASE ORAL at 06:39

## 2022-12-14 RX ADMIN — LABETALOL HYDROCHLORIDE 600 MG: 200 TABLET, FILM COATED ORAL at 08:53

## 2022-12-14 RX ADMIN — LOSARTAN POTASSIUM 100 MG: 50 TABLET, FILM COATED ORAL at 08:52

## 2022-12-14 RX ADMIN — ISOSORBIDE MONONITRATE 30 MG: 30 TABLET, EXTENDED RELEASE ORAL at 08:52

## 2022-12-14 RX ADMIN — LIDOCAINE 5% 1 PATCH: 700 PATCH TOPICAL at 08:50

## 2022-12-14 NOTE — ASSESSMENT & PLAN NOTE
· Better controlled now  · Likely exacerbated by excess volume requiring more dialysis  · Continue current regimen  · Nephrology following  · Now improved status post dialysis

## 2022-12-14 NOTE — ASSESSMENT & PLAN NOTE
Blood Pressure: 151/86    Home medications nifedipine, valsartan, labetalol, clonidine  Missed medications on day of admission  Management under HTV emergency

## 2022-12-14 NOTE — ASSESSMENT & PLAN NOTE
· Patient was to discharge yesterday however having nausea gagging and generally ill appearance  · Discharge was canceled and GI consulted  · GI consult pending appreciated

## 2022-12-14 NOTE — PROGRESS NOTES
NEPHROLOGY PROGRESS NOTE    Patient: Skinny Courtney               Sex: male          DOA: 12/12/2022 12:18 PM   YOB: 1975        Age:  50 y o         LOS:  LOS: 0 days       HPI     Patient admitted with volume overload status as well as uncontrolled blood pressure    SUBJECTIVE     Patient was dialyzed 2 days in row    Feeling better though still complaining of shortness of breath    Denies any chest pain or palpitation    Patient does have diabetic gastroparesis being monitored by GI    CURRENT MEDICATIONS       Current Facility-Administered Medications:   •  albuterol (PROVENTIL HFA,VENTOLIN HFA) inhaler 2 puff, 2 puff, Inhalation, Q4H PRN, Diego Montenegro, DO, 2 puff at 12/14/22 1216  •  atorvastatin (LIPITOR) tablet 80 mg, 80 mg, Oral, Daily, Diego Montenegro DO, 80 mg at 12/14/22 1136  •  calcium acetate (PHOSLO) capsule 667 mg, 667 mg, Oral, BID AC, Diego Montenegro DO, 667 mg at 12/14/22 9764  •  calcium gluconate 1 g in sodium chloride 0 9% 50 mL (premix), 1 g, Intravenous, Once, Diego Montenegro DO  •  cloNIDine (CATAPRES-TTS-3) 0 3 mg/24 hr TD weekly patch, 1 patch, Transdermal, Weekly, Diego Montenegro DO, 0 3 mg at 12/12/22 1756  •  dextromethorphan-guaiFENesin (ROBITUSSIN DM) oral syrup 10 mL, 10 mL, Oral, Q4H PRN, Moe Colorado MD, 10 mL at 12/13/22 1409  •  dicyclomine (BENTYL) capsule 10 mg, 10 mg, Oral, 4x Daily (AC & HS), Diego Montenegro, DO, 10 mg at 12/14/22 1216  •  dorzolamide-timolol (COSOPT) 22 3-6 8 MG/ML ophthalmic solution 1 drop, 1 drop, Both Eyes, BID, Diego Montenegro DO, 1 drop at 12/14/22 0855  •  epoetin raquel (EPOGEN,PROCRIT) injection 8,000 Units, 8,000 Units, Intravenous, Once per day on Tue Thu Sat, Diego Montenegro DO, 8,000 Units at 12/13/22 0957  •  furosemide (LASIX) tablet 80 mg, 80 mg, Oral, Daily, Diego Montenegro DO, 80 mg at 12/14/22 8614  •  heparin (porcine) subcutaneous injection 5,000 Units, 5,000 Units, Subcutaneous, Q8H [address], Diego Parameswaran, DO  •  hydrALAZINE (APRESOLINE) injection 10 mg, 10 mg, Intravenous, Q6H PRN, Diego Parameswaran, DO, 10 mg at 12/13/22 1306  •  insulin regular (HumuLIN R,NovoLIN R) injection 10 Units, 10 Units, Intravenous, Once, Diego Parameswaran, DO  •  isosorbide mononitrate (IMDUR) 24 hr tablet 30 mg, 30 mg, Oral, Daily, Diego Parameswaran, DO, 30 mg at 12/14/22 7326  •  labetalol (NORMODYNE) tablet 600 mg, 600 mg, Oral, BID, Diego Parameswaran, DO, 600 mg at 12/14/22 0853  •  lidocaine (LIDODERM) 5 % patch 1 patch, 1 patch, Topical, Daily, Diego Parameswaran, DO, 1 patch at 12/14/22 0850  •  losartan (COZAAR) tablet 100 mg, 100 mg, Oral, Daily, Diego Parameswaran, DO, 100 mg at 12/14/22 3687  •  minoxidil (LONITEN) tablet 2 5 mg, 2 5 mg, Oral, BID, Diego Parameswaran, DO, 2 5 mg at 12/14/22 0854  •  NIFEdipine (PROCARDIA XL) 24 hr tablet 60 mg, 60 mg, Oral, BID, Diego Parameswaran, DO, 60 mg at 12/14/22 0854  •  ondansetron (ZOFRAN) injection 4 mg, 4 mg, Intravenous, Q4H PRN, Pattie Schmidt MD, 4 mg at 12/13/22 1558  •  oxyCODONE (ROXICODONE) IR tablet 5 mg, 5 mg, Oral, Q6H PRN, ARIEL Little, 5 mg at 12/14/22 1216  •  pantoprazole (PROTONIX) EC tablet 40 mg, 40 mg, Oral, Early Morning, Diego Parameswaran, DO, 40 mg at 12/14/22 0639    OBJECTIVE     Current Weight: Weight - Scale: 87 kg (191 lb 12 8 oz)  Vitals:    12/14/22 0756   BP: 149/83   Pulse: 79   Resp:    Temp: 98 7 °F (37 1 °C)   SpO2: (!) 84%       Intake/Output Summary (Last 24 hours) at 12/14/2022 1447  Last data filed at 12/14/2022 1216  Gross per 24 hour   Intake 240 ml   Output --   Net 240 ml       PHYSICAL EXAMINATION     Physical Exam  Constitutional:       General: He is not in acute distress  Appearance: He is well-developed  HENT:      Head: Normocephalic  Eyes:      General: No scleral icterus  Conjunctiva/sclera: Conjunctivae normal    Neck:      Vascular: No JVD     Cardiovascular:      Rate and Rhythm: Normal rate  Heart sounds: Normal heart sounds  Pulmonary:      Effort: Pulmonary effort is normal       Breath sounds: No wheezing  Abdominal:      Palpations: Abdomen is soft  Tenderness: There is no abdominal tenderness  Musculoskeletal:         General: Normal range of motion  Cervical back: Neck supple  Skin:     General: Skin is warm  Findings: No rash  Neurological:      Mental Status: He is alert and oriented to person, place, and time  Psychiatric:         Behavior: Behavior normal           LAB RESULTS     Results from last 7 days   Lab Units 12/13/22  0527 12/12/22  1318   WBC Thousand/uL 3 13* 2 80*   HEMOGLOBIN g/dL 9 4* 9 5*   HEMATOCRIT % 30 9* 30 9*   PLATELETS Thousands/uL 99* 102*   POTASSIUM mmol/L 4 6 5 8*   CHLORIDE mmol/L 97 97   CO2 mmol/L 32 27   BUN mg/dL 60* 79*   CREATININE mg/dL 6 94* 8 29*   EGFR ml/min/1 73sq m 8 6   CALCIUM mg/dL 8 8 8 3   MAGNESIUM mg/dL 2 5  --        RADIOLOGY RESULTS      Results for orders placed during the hospital encounter of 11/15/22    XR chest 1 view portable    Narrative  CHEST    INDICATION:   cough  COMPARISON:  None    EXAM PERFORMED/VIEWS:  XR CHEST PORTABLE      FINDINGS:    Cardiomegaly seen  Mild increased lung markings seen No pneumothorax or pleural effusion  Osseous structures appear within normal limits for patient age  Impression  No acute consolidation or congestion    Mild increased lung markings likely due to hypoinflation        Workstation performed: [id number]    No results found for this or any previous visit  No results found for this or any previous visit  No results found for this or any previous visit  Results for orders placed during the hospital encounter of 11/15/22    CT abdomen pelvis wo contrast    Narrative  CT ABDOMEN AND PELVIS WITHOUT IV CONTRAST    INDICATION:   Abdominal pain, acute, nonlocalized  Abd pain and vomiting  COMPARISON:  None      TECHNIQUE:  CT examination of the abdomen and pelvis was performed without intravenous contrast  Axial, sagittal, and coronal 2D reformatted images were created from the source data and submitted for interpretation  Radiation dose length product (DLP) for this visit:  662 mGy-cm   This examination, like all CT scans performed in the [facility], was performed utilizing techniques to minimize radiation dose exposure, including the use of iterative  reconstruction and automated exposure control  Enteric contrast was administered  FINDINGS:    ABDOMEN    LOWER CHEST:  No clinically significant abnormality identified in the visualized lower chest     LIVER/BILIARY TREE:  Unremarkable  GALLBLADDER:  No calcified gallstones  No pericholecystic inflammatory change  SPLEEN:  The spleen is enlarged, measuring  16 2 cm    PANCREAS:  Unremarkable  ADRENAL GLANDS:  Unremarkable  KIDNEYS/URETERS:  Unremarkable  No hydronephrosis  STOMACH AND BOWEL:  There is colonic diverticulosis without evidence of acute diverticulitis  APPENDIX:  No findings to suggest appendicitis  ABDOMINOPELVIC CAVITY:  Mild ascites  No pneumoperitoneum  No lymphadenopathy  VESSELS:  Extensive vessel calcification  PELVIS    REPRODUCTIVE ORGANS:  Unremarkable for patient's age  URINARY BLADDER:  Unremarkable  ABDOMINAL WALL/INGUINAL REGIONS:  Unremarkable  OSSEOUS STRUCTURES:  No acute fracture or destructive osseous lesion  Impression  No evidence of acute intra-abdominal pelvic pathology  Mild ascites  Splenomegaly  Workstation performed: [id number]    No results found for this or any previous visit  PLAN / RECOMMENDATIONS      ESRD: We will get dialyzed tomorrow if patient still in the hospital otherwise he can go to the clinic    Shortness of breath: Possible related to volume versus pneumonia versus COPD    Will be seen by pulmonologist    Gastroparesis: Will be seen by gastroenterology as outpatient    We will continue to monitor    Gary Melendez MD  Nephrology  12/14/2022        Portions of the record may have been created with voice recognition software  Occasional wrong word or "sound a like" substitutions may have occurred due to the inherent limitations of voice recognition software  Read the chart carefully and recognize, using context, where substitutions have occurred

## 2022-12-14 NOTE — INCIDENTAL FINDINGS
The following findings require follow up:  Radiographic finding   Finding: Multiple, somewhat irregular, linear and tubular subpleural opacities in both lungs, most most pronounced anteriorly in the right middle and left upper lobes  These may be related to areas of scarring/atelectasis, or possibly pneumonia      Mild mosaic attenuation of the lung parenchyma, which may be seen in the setting of small vessel or small airways disease      Some areas of interlobular septal thickening suggest mild pulmonary edema      Mild mediastinal lymphadenopathy, possibly reactive      Moderate cardiomegaly  Trace pericardial effusion      Main pulmonary artery dilated up to 3 5 cm, suggestive of pulmonary arterial hypertension     Follow up required: yes   Follow up should be done within 6 week(s)    Please notify the following clinician to assist with the follow up:   Dr Gaston Sloan

## 2022-12-14 NOTE — ASSESSMENT & PLAN NOTE
Lab Results   Component Value Date    EGFR 8 12/13/2022    EGFR 6 12/12/2022    EGFR 7 11/16/2022    CREATININE 6 94 (H) 12/13/2022    CREATININE 8 29 (H) 12/12/2022    CREATININE 7 49 (H) 11/16/2022     · ESRD on HD Tuesday Thursday Saturday  · Status post session of HD 12/13, 3 liters removed  · Continue HD per nephrology schedule   · Stable from renal perspective for discharge

## 2022-12-14 NOTE — ASSESSMENT & PLAN NOTE
· Patient with complaints of shortness of breath  · Upon entering room patient is off oxygen noted transient drop in oxygenation however patient ambulated in room and in discussion with provider for 5+ minutes saturation remained 92 to 100%  · CT chest reviewed: Multiple, somewhat irregular, linear and tubular subpleural opacities in both lungs, most most pronounced anteriorly in the right middle and left upper lobes  These may be related to areas of scarring/atelectasis, or possibly pneumonia  Mild mosaic attenuation of the lung parenchyma, which may be seen in the setting of small vessel or small airways disease  Some areas of interlobular septal thickening suggest mild pulmonary edema  Mild mediastinal lymphadenopathy, possibly reactive  Moderate cardiomegaly  Trace pericardial effusion    · Procalcitonin 0 63 within normal range in patient with end-stage renal disease no signs of infection  · Discussed with outpatient pulmonology appropriate for discharge recommend ambulatory referral for outpatient PFTs

## 2022-12-14 NOTE — CONSULTS
Consultation - [address] [facility]  Marie Shepard 50 y o  male MRN: [id number]  Unit/Bed#: -01 Encounter: [id number]      Inpatient consult to gastroenterology  Consult performed by: Matt Alvarez PA-C  Consult ordered by: Renie Najjar, MD          Reason for Consult / Principal Problem: Gastroparesis    HPI: Marie Shepard is a 50y o  year old male who presents with a past medical history significant for end-stage renal disease on hemodialysis, hypertension, CHF, chronic back pain  Patient presents to [place] [address] emergency department 2 days ago with chest pain, abdominal distention, and headaches  Patient was found to have an elevated blood pressure on admission  Reason for GI consultation of gastroparesis  Patient was actually scheduled to have an outpatient gastric emptying study done today  Unfortunately patient is here in the hospital   At present patient reports that his abdominal pain nausea and vomiting have improved significantly  Patient does suffer from acid reflux disease and we did start him on pantoprazole 40 mg twice a day at his last appointment  He reports that this has really been helping him  Patient's last upper endoscopy was performed at [address]  Patient denies any significant episodes of diarrhea or constipation  Patient denies any melena or rectal bleeding  Patient is scheduled for routine outpatient follow-up with myself on January 6  REVIEW OF SYSTEMS:     CONSTITUTIONAL: Denies any fever, chills, or rigors  Good appetite, and no recent weight loss  HEENT: No earache or tinnitus  Denies hearing loss or visual disturbances  CARDIOVASCULAR: No chest pain or palpitations  RESPIRATORY: Denies any cough, hemoptysis, shortness of breath or dyspnea on exertion  GASTROINTESTINAL: As noted in the History of Present Illness  GENITOURINARY: No problems with urination  Denies any hematuria or dysuria    NEUROLOGIC: No dizziness or vertigo, denies headaches  MUSCULOSKELETAL: Denies any muscle or joint pain  SKIN: Denies skin rashes or itching  ENDOCRINE: Denies excessive thirst  Denies intolerance to heat or cold  PSYCHOSOCIAL: Denies depression or anxiety  Denies any recent memory loss  Historical Information   Past Medical History:   Diagnosis Date   • Hypertension    • Renal disorder      History reviewed  No pertinent surgical history  Social History   Social History     Substance and Sexual Activity   Alcohol Use Not Currently     Social History     Substance and Sexual Activity   Drug Use Not on file     Social History     Tobacco Use   Smoking Status Former   • Packs/day: 2 00   • Types: Cigarettes   • Quit date: 2010   • Years since quittin 0   Smokeless Tobacco Never     History reviewed  No pertinent family history      Meds/Allergies     Medications Prior to Admission   Medication   • Albuterol Sulfate 108 (90 Base) MCG/ACT AEPB   • atorvastatin (LIPITOR) 80 mg tablet   • calcium acetate (CALPHRON) 667 mg   • cloNIDine (CATAPRES-TTS-3) 0 3 mg/24 hr   • dicyclomine (BENTYL) 10 mg capsule   • dorzolamide-timolol (COSOPT) 22 3-6 8 MG/ML ophthalmic solution   • ergocalciferol (ERGOCALCIFEROL) 1 25 MG (44968 UT) capsule   • isosorbide mononitrate (IMDUR) 30 mg 24 hr tablet   • labetalol (NORMODYNE) 200 mg tablet   • metoclopramide (Reglan) 10 mg tablet   • minoxidil (LONITEN) 2 5 mg tablet   • NIFEdipine ER (ADALAT CC) 60 MG 24 hr tablet   • pantoprazole (PROTONIX) 40 mg tablet   • valsartan (DIOVAN) 320 MG tablet   • furosemide (LASIX) 80 mg tablet   • insulin glargine (LANTUS) 100 units/mL subcutaneous injection   • ondansetron (ZOFRAN-ODT) 4 mg disintegrating tablet   • sitaGLIPtin (JANUVIA) 25 mg tablet     Current Facility-Administered Medications   Medication Dose Route Frequency   • albuterol (PROVENTIL HFA,VENTOLIN HFA) inhaler 2 puff  2 puff Inhalation Q4H PRN   • atorvastatin (LIPITOR) tablet 80 mg  80 mg Oral Daily   • calcium acetate (PHOSLO) capsule 667 mg  667 mg Oral BID AC   • calcium gluconate 1 g in sodium chloride 0 9% 50 mL (premix)  1 g Intravenous Once   • cloNIDine (CATAPRES-TTS-3) 0 3 mg/24 hr TD weekly patch  1 patch Transdermal Weekly   • dextromethorphan-guaiFENesin (ROBITUSSIN DM) oral syrup 10 mL  10 mL Oral Q4H PRN   • dicyclomine (BENTYL) capsule 10 mg  10 mg Oral 4x Daily (AC & HS)   • dorzolamide-timolol (COSOPT) 22 3-6 8 MG/ML ophthalmic solution 1 drop  1 drop Both Eyes BID   • epoetin raquel (EPOGEN,PROCRIT) injection 8,000 Units  8,000 Units Intravenous Once per day on Tue Thu Sat   • furosemide (LASIX) tablet 80 mg  80 mg Oral Daily   • heparin (porcine) subcutaneous injection 5,000 Units  5,000 Units Subcutaneous Q8H [facility]   • hydrALAZINE (APRESOLINE) injection 10 mg  10 mg Intravenous Q6H PRN   • insulin regular (HumuLIN R,NovoLIN R) injection 10 Units  10 Units Intravenous Once   • isosorbide mononitrate (IMDUR) 24 hr tablet 30 mg  30 mg Oral Daily   • labetalol (NORMODYNE) tablet 600 mg  600 mg Oral BID   • lidocaine (LIDODERM) 5 % patch 1 patch  1 patch Topical Daily   • losartan (COZAAR) tablet 100 mg  100 mg Oral Daily   • minoxidil (LONITEN) tablet 2 5 mg  2 5 mg Oral BID   • NIFEdipine (PROCARDIA XL) 24 hr tablet 60 mg  60 mg Oral BID   • ondansetron (ZOFRAN) injection 4 mg  4 mg Intravenous Q4H PRN   • oxyCODONE (ROXICODONE) IR tablet 5 mg  5 mg Oral Q6H PRN   • pantoprazole (PROTONIX) EC tablet 40 mg  40 mg Oral Early Morning       No Known Allergies    Objective   Blood pressure 149/83, pulse 79, temperature 98 7 °F (37 1 °C), resp  rate 18, height 5' 8" (1 727 m), weight 87 kg (191 lb 12 8 oz), SpO2 (!) 84 %      Intake/Output Summary (Last 24 hours) at 12/14/2022 1244  Last data filed at 12/14/2022 1216  Gross per 24 hour   Intake 240 ml   Output --   Net 240 ml         PHYSICAL EXAM:      General Appearance:   Alert, cooperative, no distress, appears stated age    HEENT:   Normocephalic, atraumatic, anicteric      Neck:  Supple, symmetrical, trachea midline, no adenopathy;    thyroid: no enlargement/tenderness/nodules; no carotid  bruit or JVD    Lungs:   Clear to auscultation bilaterally; no rales, rhonchi or wheezing; respirations unlabored    Heart[de-identified]   S1 and S2 normal; regular rate and rhythm; no murmur, rub, or gallop     Abdomen:   Soft, non-tender, non-distended; normal bowel sounds; no masses, no organomegaly    Genitalia:   Deferred    Rectal:   Deferred    Extremities:  No cyanosis, clubbing or edema    Pulses:  2+ and symmetric all extremities    Skin:  Skin color, texture, turgor normal, no rashes or lesions    Lymph nodes:  No palpable cervical, axillary or inguinal lymphadenopathy        Lab Results:   Admission on 12/12/2022   Component Date Value   • WBC 12/12/2022 2 80 (L)    • RBC 12/12/2022 3 06 (L)    • Hemoglobin 12/12/2022 9 5 (L)    • Hematocrit 12/12/2022 30 9 (L)    • MCV 12/12/2022 101 (H)    • MCH 12/12/2022 31 0    • MCHC 12/12/2022 30 7 (L)    • RDW 12/12/2022 14 9    • MPV 12/12/2022 10 9    • Platelets 61/34/0926 102 (L)    • nRBC 12/12/2022 0    • Neutrophils Relative 12/12/2022 72    • Immat GRANS % 12/12/2022 0    • Lymphocytes Relative 12/12/2022 14    • Monocytes Relative 12/12/2022 8    • Eosinophils Relative 12/12/2022 5    • Basophils Relative 12/12/2022 1    • Neutrophils Absolute 12/12/2022 2 00    • Immature Grans Absolute 12/12/2022 0 01    • Lymphocytes Absolute 12/12/2022 0 39 (L)    • Monocytes Absolute 12/12/2022 0 22    • Eosinophils Absolute 12/12/2022 0 14    • Basophils Absolute 12/12/2022 0 04    • Sodium 12/12/2022 139    • Potassium 12/12/2022 5 8 (H)    • Chloride 12/12/2022 97    • CO2 12/12/2022 27    • ANION GAP 12/12/2022 15 (H)    • BUN 12/12/2022 79 (H)    • Creatinine 12/12/2022 8 29 (H)    • Glucose 12/12/2022 206 (H)    • Calcium 12/12/2022 8 3    • AST 12/12/2022 25    • ALT 12/12/2022 24    • Alkaline Phosphatase 12/12/2022 56    • Total Protein 12/12/2022 7 4    • Albumin 12/12/2022 4 2    • Total Bilirubin 12/12/2022 0 67    • eGFR 12/12/2022 6    • Protime 12/12/2022 16 1 (H)    • INR 12/12/2022 1 31 (H)    • hs TnI 0hr 12/12/2022 20    • NT-proBNP 12/12/2022 32,009 (H)    • Lipase 12/12/2022 128    • Ammonia 12/12/2022 23    • hs TnI 2hr 12/12/2022 24    • Delta 2hr hsTnI 12/12/2022 4    • hs TnI 4hr 12/12/2022 21    • Delta 4hr hsTnI 12/12/2022 1    • SARS-CoV-2 12/12/2022 Negative    • INFLUENZA A PCR 12/12/2022 Negative    • INFLUENZA B PCR 12/12/2022 Negative    • RSV PCR 12/12/2022 Negative    • POC Glucose 12/12/2022 111    • WBC 12/13/2022 3 13 (L)    • RBC 12/13/2022 3 07 (L)    • Hemoglobin 12/13/2022 9 4 (L)    • Hematocrit 12/13/2022 30 9 (L)    • MCV 12/13/2022 101 (H)    • MCH 12/13/2022 30 6    • MCHC 12/13/2022 30 4 (L)    • RDW 12/13/2022 14 9    • MPV 12/13/2022 11 0    • Platelets 99/96/9830 99 (L)    • nRBC 12/13/2022 0    • Neutrophils Relative 12/13/2022 70    • Immat GRANS % 12/13/2022 0    • Lymphocytes Relative 12/13/2022 15    • Monocytes Relative 12/13/2022 10    • Eosinophils Relative 12/13/2022 4    • Basophils Relative 12/13/2022 1    • Neutrophils Absolute 12/13/2022 2 18    • Immature Grans Absolute 12/13/2022 0 01    • Lymphocytes Absolute 12/13/2022 0 48 (L)    • Monocytes Absolute 12/13/2022 0 31    • Eosinophils Absolute 12/13/2022 0 12    • Basophils Absolute 12/13/2022 0 03    • Sodium 12/13/2022 144    • Potassium 12/13/2022 4 6    • Chloride 12/13/2022 97    • CO2 12/13/2022 32    • ANION GAP 12/13/2022 15 (H)    • BUN 12/13/2022 60 (H)    • Creatinine 12/13/2022 6 94 (H)    • Glucose 12/13/2022 147 (H)    • Calcium 12/13/2022 8 8    • AST 12/13/2022 19    • ALT 12/13/2022 22    • Alkaline Phosphatase 12/13/2022 58    • Total Protein 12/13/2022 7 3    • Albumin 12/13/2022 4 1    • Total Bilirubin 12/13/2022 0 66    • eGFR 12/13/2022 8    • Magnesium 12/13/2022 2 5    • POC Glucose 12/13/2022 135 Imaging Studies: I have personally reviewed pertinent imaging studies  ASSESSMENT & PLAN:  GERD  Gastroparesis  Abdominal pain, nausea, vomiting  -GI consulted yesterday for the work-up of gastroparesis  Patient was actually scheduled this morning for an outpatient gastric emptying study  This will need to be rescheduled   -Will continue pantoprazole 40 mg twice daily   -Will continue gastroparesis diet   -No plans for any further GI work-up at this time   -Patient should follow-up in the office at his routine scheduled appointment on January 6     -Case discussed with Titus    GI will be signing off please reconsult as needed  Patient will be seen and examined by Dr Tiny Tinsley PA-C  12/14/2022,12:44 PM

## 2022-12-14 NOTE — DISCHARGE SUMMARY
[address] [facility]  Discharge- Celestine Sandifer 1975, 50 y o  male MRN: [id number]  Unit/Bed#: -Ban Encounter: [id number]  Primary Care Provider: No primary care provider on file  Date and time admitted to hospital: 12/12/2022 12:18 PM    * Hypertensive emergency  Assessment & Plan  · Better controlled now  · Likely exacerbated by excess volume requiring more dialysis  · Continue current regimen  · Nephrology following  · Now improved status post dialysis     Shortness of breath  Assessment & Plan  · Patient with complaints of shortness of breath  · Upon entering room patient is off oxygen noted transient drop in oxygenation however patient ambulated in room and in discussion with provider for 5+ minutes saturation remained 92 to 100%  · CT chest reviewed: Multiple, somewhat irregular, linear and tubular subpleural opacities in both lungs, most most pronounced anteriorly in the right middle and left upper lobes  These may be related to areas of scarring/atelectasis, or possibly pneumonia  Mild mosaic attenuation of the lung parenchyma, which may be seen in the setting of small vessel or small airways disease  Some areas of interlobular septal thickening suggest mild pulmonary edema  Mild mediastinal lymphadenopathy, possibly reactive  Moderate cardiomegaly  Trace pericardial effusion    · Procalcitonin 0 63 within normal range in patient with end-stage renal disease no signs of infection  · Discussed with outpatient pulmonology appropriate for discharge recommend ambulatory referral for outpatient PFTs    Other chest pain  Assessment & Plan  BYRON score of 1    Lab Results   Component Value Date    HSTNI0 20 12/12/2022    HSTNI2 24 12/12/2022    HSTNID2 4 12/12/2022     · No ST changes noted on EKG  · Likely attributed to volume overload state  · No further intervention at this time  · Stable for discharge    Hypertension secondary to other renal disorders  Assessment & Plan  Blood Pressure: 151/86    Home medications nifedipine, valsartan, labetalol, clonidine  Missed medications on day of admission  Management under HTV emergency    Chronic bilateral thoracic back pain  Assessment & Plan  · Complains of bilateral thoracic back pain  · Has a history of lumbar disc disease  · Lidoderm patch  · Pain management regimen   · Stable for discharge     Gastroparesis  Assessment & Plan  · Patient was to discharge yesterday however having nausea gagging and generally ill appearance  · Discharge was canceled and GI consulted  · GI consult pending appreciated     Chronic diastolic congestive heart failure (HCC)  Assessment & Plan  · Volume overloaded on presentation in setting of missed HD sessions  · Now status post HD and stable for discharge    ESRD (end stage renal disease) ([facility] [address] )  Assessment & Plan  Lab Results   Component Value Date    EGFR 8 12/13/2022    EGFR 6 12/12/2022    EGFR 7 11/16/2022    CREATININE 6 94 (H) 12/13/2022    CREATININE 8 29 (H) 12/12/2022    CREATININE 7 49 (H) 11/16/2022     · ESRD on HD Tuesday Thursday Saturday  · Status post session of HD 12/13, 3 liters removed  · Continue HD per nephrology schedule   · Stable from renal perspective for discharge      Medical Problems     Resolved Problems  Date Reviewed: 12/14/2022   None       Discharging Physician / Practitioner: ARIEL Ramsey  PCP: No primary care provider on file    Admission Date:   Admission Orders (From admission, onward)     Ordered        12/12/22 1451  Place in Observation  Once                      Discharge Date: 12/14/22    Consultations During Hospital Stay:  · Gastroenterology  · Nephrology    Procedures Performed:   ·      Significant Findings / Test Results:   · Hypertensive urgency suspected in setting of missing dialysis  · Shortness of breath appears to have transient drops in oxygenation however no solid drops and did not have shortness of breath with ambulation or talking with provider referral to pulmonary for PFTs  · End-stage renal disease on dialysis  · Gastroparesis outpatient follow-up to reschedule emptying study    Incidental Findings:   · CT chest abdomen pelvis extensive findings understands need follow-up for GI and pulmonology  · I reviewed the above mentioned incidental findings with the patient and/or family and they expressed understanding  Test Results Pending at Discharge (will require follow up): · None     Outpatient Tests Requested:  · Emptying study  · PFTs    Complications: Nausea vomiting    Reason for Admission: Hypertensive emergency    Hospital Course:   Sukhwinder Brown is a 50 y o  male patient who originally presented to the hospital on 12/12/2022 due to end-stage renal disease on dialysis presenting with volume overload hypertensive emergency was told to come to the ED by his nephrology team   Feeling better after a double session on Saturday still having volume retention with complaints of chest pain lying flat  Patient came to the ED was seen by nephrology received an additional treatment and was overall cleared however patient was having complaints of nausea and vomiting was kept overnight for GI evaluation GI evaluated patient was supposed to have a emptying study done in the a m  of 12/14 outpatient this would have to be rescheduled and was recommended patient to have emptying study completed  Patient with complaints of shortness of breath was concern for hypoxia however was ambulated in the room with the provider directly with no sustained drops in V1 Oxygenation saturations and between 92 to 98%    CT imaging was reviewed with patient he understands the importance with following up with GI for the emptying study also lung findings were discussed at length with the patient and the shortness of breath patient history reviewed initially put down that he never smoked however on further discussion patient was a 20-year smoker quit approximately 12 to 13 years ago and was 2 packs a day  Likely component of COPD patient recommended for ambulatory referral to pulmonology overall medically cleared  Please see above list of diagnoses and related plan for additional information  Condition at Discharge: good    Discharge Day Visit / Exam:   Subjective: Patient pleasant wondering why he is ongoing shortness of breath at times however ambulating in the room having full discussion with provider for 10+ minutes without any drop in oxygenation and no complaints of shortness of breath patient reports is at various times on how he needs in his position  Patient CT findings discussed with him and states he has had pneumonia multiple times in the past but denies any fever and has no other complaints patient understands that if he does not have an infection that he may require outpatient pulmonology and is okay with that plan  Vitals: Blood Pressure: 149/83 (12/14/22 0756)  Pulse: 79 (12/14/22 0756)  Temperature: 98 7 °F (37 1 °C) (12/14/22 0756)  Temp Source: Oral (12/13/22 1827)  Respirations: 18 (12/13/22 1827)  Height: 5' 8" (172 7 cm) (12/13/22 1827)  Weight - Scale: 87 kg (191 lb 12 8 oz) (12/13/22 1827)  SpO2: (!) 84 % (12/14/22 0756)   Exam:   Physical Exam  Vitals and nursing note reviewed  Constitutional:       General: He is not in acute distress  Appearance: He is well-developed  HENT:      Head: Normocephalic and atraumatic  Eyes:      Conjunctiva/sclera: Conjunctivae normal    Cardiovascular:      Rate and Rhythm: Normal rate and regular rhythm  Heart sounds: No murmur heard  Pulmonary:      Effort: Pulmonary effort is normal  No respiratory distress  Breath sounds: Normal breath sounds  Abdominal:      Palpations: Abdomen is soft  Tenderness: There is no abdominal tenderness  Musculoskeletal:         General: No swelling  Cervical back: Neck supple  Skin:     General: Skin is warm and dry        Capillary Refill: Capillary refill takes less than 2 seconds  Neurological:      Mental Status: He is alert and oriented to person, place, and time  Mental status is at baseline  Psychiatric:         Mood and Affect: Mood normal           Discussion with Family: Patient declined call to   Discharge instructions/Information to patient and family:   See after visit summary for information provided to patient and family  Provisions for Follow-Up Care:  See after visit summary for information related to follow-up care and any pertinent home health orders  Disposition:   Home    Planned Readmission:       Discharge Statement:  I spent 40 minutes discharging the patient  This time was spent on the day of discharge  I had direct contact with the patient on the day of discharge  Greater than 50% of the total time was spent examining patient, answering all patient questions, arranging and discussing plan of care with patient as well as directly providing post-discharge instructions  Additional time then spent on discharge activities  Discharge Medications:  See after visit summary for reconciled discharge medications provided to patient and/or family        **Please Note: This note may have been constructed using a voice recognition system**

## 2022-12-15 ENCOUNTER — TELEPHONE (OUTPATIENT)
Dept: PULMONOLOGY | Facility: CLINIC | Age: 47
End: 2022-12-15

## 2022-12-15 NOTE — TELEPHONE ENCOUNTER
I did scheduled this pt from a referral I received    He was hospitalized but not seen by pulmonary while in hospital    I scheduled him with dr Elo Sparks for 2/15/2023 but think he might need to be seen sooner   Pt claims no oxygen was sent home with him and he is very sob

## 2022-12-23 ENCOUNTER — OFFICE VISIT (OUTPATIENT)
Dept: INTERNAL MEDICINE CLINIC | Facility: CLINIC | Age: 47
End: 2022-12-23

## 2022-12-23 VITALS
HEART RATE: 104 BPM | SYSTOLIC BLOOD PRESSURE: 142 MMHG | OXYGEN SATURATION: 89 % | WEIGHT: 205 LBS | DIASTOLIC BLOOD PRESSURE: 70 MMHG | BODY MASS INDEX: 31.07 KG/M2 | TEMPERATURE: 98.4 F | RESPIRATION RATE: 20 BRPM | HEIGHT: 68 IN

## 2022-12-23 DIAGNOSIS — Z11.59 ENCOUNTER FOR HEPATITIS C SCREENING TEST FOR LOW RISK PATIENT: ICD-10-CM

## 2022-12-23 DIAGNOSIS — M54.41 CHRONIC MIDLINE LOW BACK PAIN WITH BILATERAL SCIATICA: ICD-10-CM

## 2022-12-23 DIAGNOSIS — I10 PRIMARY HYPERTENSION: ICD-10-CM

## 2022-12-23 DIAGNOSIS — K31.84 GASTROPARESIS: ICD-10-CM

## 2022-12-23 DIAGNOSIS — Z11.4 SCREENING FOR HIV WITHOUT PRESENCE OF RISK FACTORS: ICD-10-CM

## 2022-12-23 DIAGNOSIS — G89.29 CHRONIC MIDLINE LOW BACK PAIN WITH BILATERAL SCIATICA: ICD-10-CM

## 2022-12-23 DIAGNOSIS — E11.9 TYPE 2 DIABETES MELLITUS WITHOUT COMPLICATION, UNSPECIFIED WHETHER LONG TERM INSULIN USE (HCC): ICD-10-CM

## 2022-12-23 DIAGNOSIS — M54.42 CHRONIC MIDLINE LOW BACK PAIN WITH BILATERAL SCIATICA: ICD-10-CM

## 2022-12-23 DIAGNOSIS — N18.6 ESRD (END STAGE RENAL DISEASE) (HCC): ICD-10-CM

## 2022-12-23 DIAGNOSIS — Z76.89 ESTABLISHING CARE WITH NEW DOCTOR, ENCOUNTER FOR: Primary | ICD-10-CM

## 2022-12-23 DIAGNOSIS — F51.01 PRIMARY INSOMNIA: ICD-10-CM

## 2022-12-23 DIAGNOSIS — G47.30 SLEEP APNEA, UNSPECIFIED TYPE: ICD-10-CM

## 2022-12-23 PROBLEM — R77.8 ELEVATED TROPONIN: Status: RESOLVED | Noted: 2022-01-01 | Resolved: 2022-01-01

## 2022-12-23 PROBLEM — R07.89 OTHER CHEST PAIN: Status: RESOLVED | Noted: 2022-12-12 | Resolved: 2022-12-23

## 2022-12-23 PROBLEM — R06.02 SHORTNESS OF BREATH: Status: RESOLVED | Noted: 2022-12-14 | Resolved: 2022-12-23

## 2022-12-23 PROBLEM — M54.6 CHRONIC BILATERAL THORACIC BACK PAIN: Status: RESOLVED | Noted: 2022-01-01 | Resolved: 2022-01-01

## 2022-12-23 PROBLEM — R05.9 COUGH: Status: RESOLVED | Noted: 2022-11-15 | Resolved: 2022-12-23

## 2022-12-23 PROBLEM — I16.1 HYPERTENSIVE EMERGENCY: Status: RESOLVED | Noted: 2022-12-12 | Resolved: 2022-12-23

## 2022-12-23 PROBLEM — I16.0 HYPERTENSIVE URGENCY: Status: RESOLVED | Noted: 2022-11-15 | Resolved: 2022-12-23

## 2022-12-23 LAB — SL AMB POCT HEMOGLOBIN AIC: 6.3 (ref ?–6.5)

## 2022-12-23 RX ORDER — TRAZODONE HYDROCHLORIDE 50 MG/1
50 TABLET ORAL
Qty: 90 TABLET | Refills: 3 | Status: SHIPPED | OUTPATIENT
Start: 2022-12-23

## 2022-12-23 NOTE — PROGRESS NOTES
Assessment/Plan:     Patient is a very pleasant 57-year-old male here to establish care  He has a past medical history for end-stage renal disease on hemodialysis secondary to uncontrolled high blood pressure and diabetes  Recently moved here  He is living with his parents  Main complaints today are difficulty sleeping secondary to anxiety and difficulty breathing  Notes periods of apnea  Will need a sleep study  We will send in sleep medicine  Also reports history of gastroparesis  He was recently admitted to the hospital with shortness of breath  He has an appointment with GI coming up  Also has an appointment with pulmonary and nephrology;     Weight 205 from 191 going to HD tomorrow    Quality Measures:     BMI Counseling: Body mass index is 31 17 kg/m²  The BMI is above normal  Nutrition recommendations include decreasing portion sizes and encouraging healthy choices of fruits and vegetables  Exercise recommendations include moderate physical activity 150 minutes/week  Rationale for BMI follow-up plan is due to patient being overweight or obese  Depression Screening and Follow-up Plan: Patient was screened for depression during today's encounter  They screened negative with a PHQ-2 score of 0  Return in about 3 months (around 3/23/2023)  No problem-specific Assessment & Plan notes found for this encounter  Diagnoses and all orders for this visit:    Establishing care with new doctor, encounter for    Type 2 diabetes mellitus without complication, unspecified whether long term insulin use ([address] )  -     CBC and differential; Future  -     Comprehensive metabolic panel; Future  -     TSH, 3rd generation with Free T4 reflex; Future  -     Lipid Panel with Direct LDL reflex;  Future  -     POCT hemoglobin A1c  -     CBC and differential  -     Comprehensive metabolic panel  -     TSH, 3rd generation with Free T4 reflex  -     Lipid Panel with Direct LDL reflex    ESRD (end stage renal disease) ([address] )    Gastroparesis    Encounter for hepatitis C screening test for low risk patient  -     Hepatitis C antibody; Future    Primary hypertension    Screening for HIV without presence of risk factors  -     HIV-1 RNA, quantitative, PCR; Future  -     HIV-1 RNA, quantitative, PCR    Sleep apnea, unspecified type  -     Ambulatory Referral to Sleep Medicine; Future    Primary insomnia  -     traZODone (DESYREL) 50 mg tablet; Take 1 tablet (50 mg total) by mouth daily at bedtime    Chronic midline low back pain with bilateral sciatica  -     Ambulatory Referral to Pain Management; Future          Subjective:      Patient ID: Vargas Rushing is a 50 y o  male  Here to establish care        ALLERGIES:  No Known Allergies    CURRENT MEDICATIONS:    Current Outpatient Medications:   •  Albuterol Sulfate 108 (90 Base) MCG/ACT AEPB, Inhale 2 puffs, Disp: , Rfl:   •  atorvastatin (LIPITOR) 80 mg tablet, Take 1 tablet (80 mg total) by mouth daily, Disp: 60 tablet, Rfl: 0  •  calcium acetate (CALPHRON) 667 mg, Take by mouth 2 (two) times a day as needed (With snacks), Disp: , Rfl:   •  cloNIDine (CATAPRES-TTS-3) 0 3 mg/24 hr, Place 1 patch (0 3 mg total) on the skin once a week, Disp: 8 patch, Rfl: 0  •  dextromethorphan-guaiFENesin (ROBITUSSIN DM)  mg/5 mL syrup, Take 10 mL by mouth every 4 (four) hours as needed for cough for up to 10 days, Disp: 473 mL, Rfl: 0  •  dicyclomine (BENTYL) 10 mg capsule, Take 1 capsule (10 mg total) by mouth 4 (four) times a day (before meals and at bedtime), Disp: 240 capsule, Rfl: 0  •  dorzolamide-timolol (COSOPT) 22 3-6 8 MG/ML ophthalmic solution, Administer 1 drop to both eyes 2 (two) times a day, Disp: , Rfl:   •  ergocalciferol (ERGOCALCIFEROL) 1 25 MG (85802 UT) capsule, Take 50,000 Units by mouth every 7 days, Disp: , Rfl:   •  isosorbide mononitrate (IMDUR) 30 mg 24 hr tablet, Take 1 tablet (30 mg total) by mouth daily, Disp: 60 tablet, Rfl: 0  •  labetalol (NORMODYNE) 200 mg tablet, Take 3 tablets (600 mg total) by mouth 2 (two) times a day, Disp: 360 tablet, Rfl: 0  •  lidocaine (LIDODERM) 5 %, Apply 1 patch topically daily for 10 days Remove & Discard patch within 12 hours or as directed by MD Do not start before December 14, 2022 , Disp: 10 patch, Rfl: 0  •  metoclopramide (Reglan) 10 mg tablet, Take 1 tablet (10 mg total) by mouth 4 (four) times a day, Disp: 240 tablet, Rfl: 0  •  minoxidil (LONITEN) 2 5 mg tablet, Take 1 tablet (2 5 mg total) by mouth 2 (two) times a day, Disp: 120 tablet, Rfl: 0  •  NIFEdipine ER (ADALAT CC) 60 MG 24 hr tablet, Take 1 tablet (60 mg total) by mouth 2 (two) times a day, Disp: 120 tablet, Rfl: 0  •  ondansetron (ZOFRAN-ODT) 4 mg disintegrating tablet, Take 4 mg by mouth every 6 (six) hours as needed for nausea or vomiting, Disp: , Rfl:   •  pantoprazole (PROTONIX) 40 mg tablet, Take 1 tablet (40 mg total) by mouth 2 (two) times a day, Disp: 60 tablet, Rfl: 3  •  traZODone (DESYREL) 50 mg tablet, Take 1 tablet (50 mg total) by mouth daily at bedtime, Disp: 90 tablet, Rfl: 3  •  valsartan (DIOVAN) 320 MG tablet, Take 1 tablet (320 mg total) by mouth daily, Disp: 60 tablet, Rfl: 0    ACTIVE PROBLEM LIST:  Patient Active Problem List   Diagnosis   • ESRD (end stage renal disease) (HCC)   • Chronic diastolic congestive heart failure (HCC)   • T2DM (type 2 diabetes mellitus) ([facility])   • Gastroparesis   • Hypertension secondary to other renal disorders   • Primary hypertension   • Sleep apnea   • Primary insomnia   • Chronic midline low back pain with bilateral sciatica       PAST MEDICAL HISTORY:  Past Medical History:   Diagnosis Date   • Hypertension    • Renal disorder        PAST SURGICAL HISTORY:  History reviewed  No pertinent surgical history  FAMILY HISTORY:  History reviewed  No pertinent family history      SOCIAL HISTORY:  Social History     Socioeconomic History   • Marital status: Single     Spouse name: Not on file   • Number of children: Not on file   • Years of education: Not on file   • Highest education level: Not on file   Occupational History   • Not on file   Tobacco Use   • Smoking status: Former     Packs/day: 2 00     Types: Cigarettes     Quit date: 2010     Years since quittin 0   • Smokeless tobacco: Never   Vaping Use   • Vaping Use: Never used   Substance and Sexual Activity   • Alcohol use: Not Currently   • Drug use: Yes     Types: Other   • Sexual activity: Yes   Other Topics Concern   • Not on file   Social History Narrative   • Not on file     Social Determinants of Health     Financial Resource Strain: Not on file   Food Insecurity: Not on file   Transportation Needs: Not on file   Physical Activity: Not on file   Stress: Not on file   Social Connections: Not on file   Intimate Partner Violence: Not on file   Housing Stability: Not on file       Review of Systems   Respiratory: Positive for shortness of breath  Musculoskeletal: Positive for arthralgias and back pain  Psychiatric/Behavioral: Positive for sleep disturbance  The patient is nervous/anxious  All other systems reviewed and are negative  Objective:  Vitals:    22 1358   BP: 142/70   BP Location: Right arm   Patient Position: Sitting   Cuff Size: Standard   Pulse: 104   Resp: 20   Temp: 98 4 °F (36 9 °C)   TempSrc: Tympanic   SpO2: (!) 89%   Weight: 93 kg (205 lb)   Height: 5' 8" (1 727 m)     Body mass index is 31 17 kg/m²  Physical Exam  Vitals and nursing note reviewed  Constitutional:       Appearance: Normal appearance  HENT:      Head: Normocephalic and atraumatic  Cardiovascular:      Rate and Rhythm: Normal rate and regular rhythm  Heart sounds: Normal heart sounds  Pulmonary:      Effort: Pulmonary effort is normal       Breath sounds: Normal breath sounds  Abdominal:      General: There is distension  Palpations: Abdomen is soft     Musculoskeletal:         General: Normal range of motion  Cervical back: Normal range of motion  Right lower leg: Edema present  Left lower leg: Edema present  Lymphadenopathy:      Cervical: No cervical adenopathy  Skin:     General: Skin is warm and dry  Neurological:      General: No focal deficit present  Mental Status: He is alert and oriented to person, place, and time  Mental status is at baseline     Psychiatric:         Mood and Affect: Mood normal          Behavior: Behavior normal            RESULTS:    Recent Results (from the past 1008 hour(s))   ECG 12 lead    Collection Time: 11/15/22  5:10 PM   Result Value Ref Range    Ventricular Rate 89 BPM    Atrial Rate 89 BPM    HI Interval 162 ms    QRSD Interval 88 ms    QT Interval 406 ms    QTC Interval 493 ms    P Axis 61 degrees    QRS Axis 79 degrees    T Wave Axis 70 degrees   CBC and differential    Collection Time: 11/15/22  5:14 PM   Result Value Ref Range    WBC 4 50 4 31 - 10 16 Thousand/uL    RBC 3 89 3 88 - 5 62 Million/uL    Hemoglobin 11 9 (L) 12 0 - 17 0 g/dL    Hematocrit 37 6 36 5 - 49 3 %    MCV 97 82 - 98 fL    MCH 30 6 26 8 - 34 3 pg    MCHC 31 6 31 4 - 37 4 g/dL    RDW 13 8 11 6 - 15 1 %    MPV 11 1 8 9 - 12 7 fL    Platelets 165 (L) 368 - 390 Thousands/uL    nRBC 0 /100 WBCs    Neutrophils Relative 74 43 - 75 %    Immat GRANS % 0 0 - 2 %    Lymphocytes Relative 11 (L) 14 - 44 %    Monocytes Relative 8 4 - 12 %    Eosinophils Relative 6 0 - 6 %    Basophils Relative 1 0 - 1 %    Neutrophils Absolute 3 31 1 85 - 7 62 Thousands/µL    Immature Grans Absolute 0 01 0 00 - 0 20 Thousand/uL    Lymphocytes Absolute 0 48 (L) 0 60 - 4 47 Thousands/µL    Monocytes Absolute 0 38 0 17 - 1 22 Thousand/µL    Eosinophils Absolute 0 27 0 00 - 0 61 Thousand/µL    Basophils Absolute 0 05 0 00 - 0 10 Thousands/µL   Comprehensive metabolic panel    Collection Time: 11/15/22  5:14 PM   Result Value Ref Range    Sodium 136 135 - 147 mmol/L    Potassium 3 8 3 5 - 5 3 mmol/L    Chloride 93 (L) 96 - 108 mmol/L    CO2 31 21 - 32 mmol/L    ANION GAP 12 4 - 13 mmol/L    BUN 38 (H) 5 - 25 mg/dL    Creatinine 6 45 (H) 0 60 - 1 30 mg/dL    Glucose 90 65 - 140 mg/dL    Calcium 9 2 8 3 - 10 1 mg/dL    AST 14 5 - 45 U/L    ALT 11 (L) 12 - 78 U/L    Alkaline Phosphatase 64 46 - 116 U/L    Total Protein 7 5 6 4 - 8 4 g/dL    Albumin 4 4 3 5 - 5 0 g/dL    Total Bilirubin 0 92 0 20 - 1 00 mg/dL    eGFR 9 ml/min/1 73sq m   HS Troponin 0hr (reflex protocol)    Collection Time: 11/15/22  5:14 PM   Result Value Ref Range    hs TnI 0hr 46 "Refer to ACS Flowchart"- see link ng/L   HS Troponin I 2hr    Collection Time: 11/15/22  8:06 PM   Result Value Ref Range    hs TnI 2hr 52 (H) "Refer to ACS Flowchart"- see link ng/L    Delta 2hr hsTnI 6 <20 ng/L   FLU/RSV/COVID - if FLU/RSV clinically relevant    Collection Time: 11/15/22  8:06 PM    Specimen: Nose; Nares   Result Value Ref Range    SARS-CoV-2 Negative Negative    INFLUENZA A PCR Negative Negative    INFLUENZA B PCR Negative Negative    RSV PCR Negative Negative   HS Troponin I 4hr    Collection Time: 11/15/22  9:01 PM   Result Value Ref Range    hs TnI 4hr 49 "Refer to ACS Flowchart"- see link ng/L    Delta 4hr hsTnI 3 <20 ng/L   CBC and differential    Collection Time: 11/16/22  5:37 AM   Result Value Ref Range    WBC 4 97 4 31 - 10 16 Thousand/uL    RBC 3 77 (L) 3 88 - 5 62 Million/uL    Hemoglobin 11 6 (L) 12 0 - 17 0 g/dL    Hematocrit 36 3 (L) 36 5 - 49 3 %    MCV 96 82 - 98 fL    MCH 30 8 26 8 - 34 3 pg    MCHC 32 0 31 4 - 37 4 g/dL    RDW 13 9 11 6 - 15 1 %    MPV 10 7 8 9 - 12 7 fL    Platelets 723 (L) 575 - 390 Thousands/uL    nRBC 0 /100 WBCs    Neutrophils Relative 70 43 - 75 %    Immat GRANS % 0 0 - 2 %    Lymphocytes Relative 14 14 - 44 %    Monocytes Relative 11 4 - 12 %    Eosinophils Relative 4 0 - 6 %    Basophils Relative 1 0 - 1 %    Neutrophils Absolute 3 46 1 85 - 7 62 Thousands/µL    Immature Grans Absolute 0 01 0 00 - 0 20 Thousand/uL Lymphocytes Absolute 0 69 0 60 - 4 47 Thousands/µL    Monocytes Absolute 0 55 0 17 - 1 22 Thousand/µL    Eosinophils Absolute 0 21 0 00 - 0 61 Thousand/µL    Basophils Absolute 0 05 0 00 - 0 10 Thousands/µL   Basic metabolic panel    Collection Time: 11/16/22  5:37 AM   Result Value Ref Range    Sodium 141 135 - 147 mmol/L    Potassium 4 9 3 5 - 5 3 mmol/L    Chloride 97 96 - 108 mmol/L    CO2 31 21 - 32 mmol/L    ANION GAP 13 4 - 13 mmol/L    BUN 46 (H) 5 - 25 mg/dL    Creatinine 7 49 (H) 0 60 - 1 30 mg/dL    Glucose 71 65 - 140 mg/dL    Calcium 8 8 8 3 - 10 1 mg/dL    eGFR 7 ml/min/1 73sq m   CBC and differential    Collection Time: 12/12/22  1:18 PM   Result Value Ref Range    WBC 2 80 (L) 4 31 - 10 16 Thousand/uL    RBC 3 06 (L) 3 88 - 5 62 Million/uL    Hemoglobin 9 5 (L) 12 0 - 17 0 g/dL    Hematocrit 30 9 (L) 36 5 - 49 3 %     (H) 82 - 98 fL    MCH 31 0 26 8 - 34 3 pg    MCHC 30 7 (L) 31 4 - 37 4 g/dL    RDW 14 9 11 6 - 15 1 %    MPV 10 9 8 9 - 12 7 fL    Platelets 571 (L) 931 - 390 Thousands/uL    nRBC 0 /100 WBCs    Neutrophils Relative 72 43 - 75 %    Immat GRANS % 0 0 - 2 %    Lymphocytes Relative 14 14 - 44 %    Monocytes Relative 8 4 - 12 %    Eosinophils Relative 5 0 - 6 %    Basophils Relative 1 0 - 1 %    Neutrophils Absolute 2 00 1 85 - 7 62 Thousands/µL    Immature Grans Absolute 0 01 0 00 - 0 20 Thousand/uL    Lymphocytes Absolute 0 39 (L) 0 60 - 4 47 Thousands/µL    Monocytes Absolute 0 22 0 17 - 1 22 Thousand/µL    Eosinophils Absolute 0 14 0 00 - 0 61 Thousand/µL    Basophils Absolute 0 04 0 00 - 0 10 Thousands/µL   Comprehensive metabolic panel    Collection Time: 12/12/22  1:18 PM   Result Value Ref Range    Sodium 139 135 - 147 mmol/L    Potassium 5 8 (H) 3 5 - 5 3 mmol/L    Chloride 97 96 - 108 mmol/L    CO2 27 21 - 32 mmol/L    ANION GAP 15 (H) 4 - 13 mmol/L    BUN 79 (H) 5 - 25 mg/dL    Creatinine 8 29 (H) 0 60 - 1 30 mg/dL    Glucose 206 (H) 65 - 140 mg/dL    Calcium 8 3 8 3 - 10 1 mg/dL    AST 25 5 - 45 U/L    ALT 24 12 - 78 U/L    Alkaline Phosphatase 56 46 - 116 U/L    Total Protein 7 4 6 4 - 8 4 g/dL    Albumin 4 2 3 5 - 5 0 g/dL    Total Bilirubin 0 67 0 20 - 1 00 mg/dL    eGFR 6 ml/min/1 73sq m   Protime-INR    Collection Time: 12/12/22  1:18 PM   Result Value Ref Range    Protime 16 1 (H) 11 6 - 14 5 seconds    INR 1 31 (H) 0 84 - 1 19   HS Troponin 0hr (reflex protocol)    Collection Time: 12/12/22  1:18 PM   Result Value Ref Range    hs TnI 0hr 20 "Refer to ACS Flowchart"- see link ng/L   NT-BNP PRO    Collection Time: 12/12/22  1:18 PM   Result Value Ref Range    NT-proBNP 32,009 (H) <125 pg/mL   Lipase    Collection Time: 12/12/22  1:18 PM   Result Value Ref Range    Lipase 128 73 - 393 u/L   Ammonia    Collection Time: 12/12/22  1:18 PM   Result Value Ref Range    Ammonia 23 11 - 35 umol/L   HS Troponin I 2hr    Collection Time: 12/12/22  3:17 PM   Result Value Ref Range    hs TnI 2hr 24 "Refer to ACS Flowchart"- see link ng/L    Delta 2hr hsTnI 4 <20 ng/L   FLU/RSV/COVID - if FLU/RSV clinically relevant    Collection Time: 12/12/22  3:30 PM    Specimen: Nose; Nares   Result Value Ref Range    SARS-CoV-2 Negative Negative    INFLUENZA A PCR Negative Negative    INFLUENZA B PCR Negative Negative    RSV PCR Negative Negative   HS Troponin I 4hr    Collection Time: 12/12/22  5:20 PM   Result Value Ref Range    hs TnI 4hr 21 "Refer to ACS Flowchart"- see link ng/L    Delta 4hr hsTnI 1 <20 ng/L   Fingerstick Glucose (POCT)    Collection Time: 12/12/22  5:57 PM   Result Value Ref Range    POC Glucose 111 65 - 140 mg/dl   CBC and differential    Collection Time: 12/13/22  5:27 AM   Result Value Ref Range    WBC 3 13 (L) 4 31 - 10 16 Thousand/uL    RBC 3 07 (L) 3 88 - 5 62 Million/uL    Hemoglobin 9 4 (L) 12 0 - 17 0 g/dL    Hematocrit 30 9 (L) 36 5 - 49 3 %     (H) 82 - 98 fL    MCH 30 6 26 8 - 34 3 pg    MCHC 30 4 (L) 31 4 - 37 4 g/dL    RDW 14 9 11 6 - 15 1 %    MPV 11 0 8 9 - 12 7 fL    Platelets 99 (L) 154 - 390 Thousands/uL    nRBC 0 /100 WBCs    Neutrophils Relative 70 43 - 75 %    Immat GRANS % 0 0 - 2 %    Lymphocytes Relative 15 14 - 44 %    Monocytes Relative 10 4 - 12 %    Eosinophils Relative 4 0 - 6 %    Basophils Relative 1 0 - 1 %    Neutrophils Absolute 2 18 1 85 - 7 62 Thousands/µL    Immature Grans Absolute 0 01 0 00 - 0 20 Thousand/uL    Lymphocytes Absolute 0 48 (L) 0 60 - 4 47 Thousands/µL    Monocytes Absolute 0 31 0 17 - 1 22 Thousand/µL    Eosinophils Absolute 0 12 0 00 - 0 61 Thousand/µL    Basophils Absolute 0 03 0 00 - 0 10 Thousands/µL   Comprehensive metabolic panel    Collection Time: 12/13/22  5:27 AM   Result Value Ref Range    Sodium 144 135 - 147 mmol/L    Potassium 4 6 3 5 - 5 3 mmol/L    Chloride 97 96 - 108 mmol/L    CO2 32 21 - 32 mmol/L    ANION GAP 15 (H) 4 - 13 mmol/L    BUN 60 (H) 5 - 25 mg/dL    Creatinine 6 94 (H) 0 60 - 1 30 mg/dL    Glucose 147 (H) 65 - 140 mg/dL    Calcium 8 8 8 3 - 10 1 mg/dL    AST 19 5 - 45 U/L    ALT 22 12 - 78 U/L    Alkaline Phosphatase 58 46 - 116 U/L    Total Protein 7 3 6 4 - 8 4 g/dL    Albumin 4 1 3 5 - 5 0 g/dL    Total Bilirubin 0 66 0 20 - 1 00 mg/dL    eGFR 8 ml/min/1 73sq m   Magnesium    Collection Time: 12/13/22  5:27 AM   Result Value Ref Range    Magnesium 2 5 1 6 - 2 6 mg/dL   Fingerstick Glucose (POCT)    Collection Time: 12/13/22  7:48 AM   Result Value Ref Range    POC Glucose 135 65 - 140 mg/dl   Procalcitonin    Collection Time: 12/14/22 12:25 PM   Result Value Ref Range    Procalcitonin 0 63 (H) <=0 25 ng/ml   POCT hemoglobin A1c    Collection Time: 12/23/22  2:16 PM   Result Value Ref Range    Hemoglobin A1C 6 3 6 5       This note was created with voice recognition software  Phonic, grammatical and spelling errors may be present within the note as a result

## 2022-12-26 DIAGNOSIS — R10.9 ABDOMINAL PAIN, UNSPECIFIED ABDOMINAL LOCATION: ICD-10-CM

## 2022-12-26 DIAGNOSIS — I16.0 HYPERTENSIVE URGENCY: ICD-10-CM

## 2022-12-27 RX ORDER — CLONIDINE 0.3 MG/24H
1 PATCH, EXTENDED RELEASE TRANSDERMAL WEEKLY
Qty: 4 PATCH | Refills: 1 | Status: SHIPPED | OUTPATIENT
Start: 2022-12-27 | End: 2023-02-25

## 2022-12-27 RX ORDER — VALSARTAN 320 MG/1
TABLET ORAL
Qty: 60 TABLET | Refills: 0 | Status: SHIPPED | OUTPATIENT
Start: 2022-12-27

## 2022-12-27 RX ORDER — ATORVASTATIN CALCIUM 80 MG/1
TABLET, FILM COATED ORAL
Qty: 60 TABLET | Refills: 0 | Status: SHIPPED | OUTPATIENT
Start: 2022-12-27

## 2022-12-27 RX ORDER — MINOXIDIL 2.5 MG/1
TABLET ORAL
Qty: 120 TABLET | Refills: 0 | Status: SHIPPED | OUTPATIENT
Start: 2022-12-27

## 2022-12-27 RX ORDER — NIFEDIPINE 60 MG/1
TABLET, FILM COATED, EXTENDED RELEASE ORAL
Qty: 120 TABLET | Refills: 0 | Status: SHIPPED | OUTPATIENT
Start: 2022-12-27

## 2022-12-27 RX ORDER — ISOSORBIDE MONONITRATE 30 MG/1
TABLET, EXTENDED RELEASE ORAL
Qty: 60 TABLET | Refills: 0 | Status: SHIPPED | OUTPATIENT
Start: 2022-12-27

## 2022-12-27 RX ORDER — LABETALOL 200 MG/1
600 TABLET, FILM COATED ORAL 2 TIMES DAILY
Qty: 360 TABLET | Refills: 0 | Status: SHIPPED | OUTPATIENT
Start: 2022-12-27 | End: 2023-02-25

## 2022-12-27 RX ORDER — DICYCLOMINE HYDROCHLORIDE 10 MG/1
CAPSULE ORAL
Qty: 120 CAPSULE | Refills: 1 | Status: SHIPPED | OUTPATIENT
Start: 2022-12-27

## 2023-01-01 ENCOUNTER — OFFICE VISIT (OUTPATIENT)
Dept: URGENT CARE | Facility: CLINIC | Age: 48
End: 2023-01-01

## 2023-01-01 ENCOUNTER — APPOINTMENT (OUTPATIENT)
Dept: RADIOLOGY | Facility: CLINIC | Age: 48
End: 2023-01-01

## 2023-01-01 ENCOUNTER — HOSPITAL ENCOUNTER (EMERGENCY)
Facility: HOSPITAL | Age: 48
End: 2023-03-09
Attending: EMERGENCY MEDICINE | Admitting: EMERGENCY MEDICINE

## 2023-01-01 VITALS
SYSTOLIC BLOOD PRESSURE: 203 MMHG | DIASTOLIC BLOOD PRESSURE: 114 MMHG | OXYGEN SATURATION: 94 % | HEART RATE: 80 BPM | RESPIRATION RATE: 18 BRPM

## 2023-01-01 VITALS — DIASTOLIC BLOOD PRESSURE: 44 MMHG | OXYGEN SATURATION: 22 % | SYSTOLIC BLOOD PRESSURE: 111 MMHG

## 2023-01-01 DIAGNOSIS — Z99.2 ESRD (END STAGE RENAL DISEASE) ON DIALYSIS (HCC): ICD-10-CM

## 2023-01-01 DIAGNOSIS — M79.642 HAND PAIN, LEFT: ICD-10-CM

## 2023-01-01 DIAGNOSIS — E87.5 HYPERKALEMIA: ICD-10-CM

## 2023-01-01 DIAGNOSIS — S63.502A WRIST SPRAIN, LEFT, INITIAL ENCOUNTER: Primary | ICD-10-CM

## 2023-01-01 DIAGNOSIS — I46.9 CARDIOPULMONARY ARREST (HCC): Primary | ICD-10-CM

## 2023-01-01 DIAGNOSIS — N18.6 ESRD (END STAGE RENAL DISEASE) ON DIALYSIS (HCC): ICD-10-CM

## 2023-01-01 DIAGNOSIS — E87.20 METABOLIC ACIDOSIS: ICD-10-CM

## 2023-01-01 LAB
CA-I BLD-SCNC: 1.13 MMOL/L (ref 1.12–1.32)
GLUCOSE SERPL-MCNC: 28 MG/DL (ref 65–140)
GLUCOSE SERPL-MCNC: 77 MG/DL (ref 65–140)
HCT VFR BLD CALC: 25 % (ref 36.5–49.3)
HGB BLDA-MCNC: 8.5 G/DL (ref 12–17)
PCO2 BLD: >103 MM HG (ref 42–50)
PH BLD: 6.77 [PH] (ref 7.3–7.4)
PO2 BLD: 48 MM HG (ref 35–45)
POTASSIUM BLD-SCNC: >8 MMOL/L (ref 3.5–5.3)
SODIUM BLD-SCNC: 134 MMOL/L (ref 136–145)
SPECIMEN SOURCE: ABNORMAL

## 2023-01-01 RX ORDER — SODIUM BICARBONATE 84 MG/ML
INJECTION, SOLUTION INTRAVENOUS CODE/TRAUMA/SEDATION MEDICATION
Status: COMPLETED | OUTPATIENT
Start: 2023-01-01 | End: 2023-01-01

## 2023-01-01 RX ORDER — CALCIUM CHLORIDE 100 MG/ML
SYRINGE (ML) INTRAVENOUS CODE/TRAUMA/SEDATION MEDICATION
Status: COMPLETED | OUTPATIENT
Start: 2023-01-01 | End: 2023-01-01

## 2023-01-01 RX ORDER — EPINEPHRINE 0.1 MG/ML
SYRINGE (ML) INJECTION CODE/TRAUMA/SEDATION MEDICATION
Status: COMPLETED | OUTPATIENT
Start: 2023-01-01 | End: 2023-01-01

## 2023-01-01 RX ORDER — CALCIUM CHLORIDE 100 MG/ML
INJECTION INTRAVENOUS; INTRAVENTRICULAR
Status: DISCONTINUED
Start: 2023-01-01 | End: 2023-01-01 | Stop reason: HOSPADM

## 2023-01-01 RX ORDER — DEXTROSE 50 % IN WATER 50 %
SYRINGE (ML) INTRAVENOUS CODE/TRAUMA/SEDATION MEDICATION
Status: COMPLETED | OUTPATIENT
Start: 2023-01-01 | End: 2023-01-01

## 2023-01-01 RX ADMIN — EPINEPHRINE 1 MG: 0.1 INJECTION, SOLUTION ENDOTRACHEAL; INTRACARDIAC; INTRAVENOUS at 07:46

## 2023-01-01 RX ADMIN — CALCIUM CHLORIDE 1 G: 100 INJECTION PARENTERAL at 07:37

## 2023-01-01 RX ADMIN — SODIUM BICARBONATE 50 MEQ: 84 INJECTION, SOLUTION INTRAVENOUS at 07:44

## 2023-01-01 RX ADMIN — SODIUM BICARBONATE 50 MEQ: 84 INJECTION, SOLUTION INTRAVENOUS at 07:56

## 2023-01-01 RX ADMIN — DEXTROSE MONOHYDRATE 25 G: 500 INJECTION PARENTERAL at 07:55

## 2023-01-01 RX ADMIN — CALCIUM CHLORIDE 1 G: 100 INJECTION PARENTERAL at 07:43

## 2023-01-01 RX ADMIN — EPINEPHRINE 1 MG: 0.1 INJECTION, SOLUTION ENDOTRACHEAL; INTRACARDIAC; INTRAVENOUS at 07:49

## 2023-01-01 RX ADMIN — EPINEPHRINE 1 MG: 0.1 INJECTION, SOLUTION ENDOTRACHEAL; INTRACARDIAC; INTRAVENOUS at 07:51

## 2023-01-01 RX ADMIN — EPINEPHRINE 1 MG: 0.1 INJECTION, SOLUTION ENDOTRACHEAL; INTRACARDIAC; INTRAVENOUS at 07:38

## 2023-01-01 RX ADMIN — EPINEPHRINE 1 MG: 0.1 INJECTION, SOLUTION ENDOTRACHEAL; INTRACARDIAC; INTRAVENOUS at 07:41

## 2023-01-01 RX ADMIN — CALCIUM CHLORIDE 1 G: 100 INJECTION PARENTERAL at 07:56

## 2023-01-01 RX ADMIN — EPINEPHRINE 1 MG: 0.1 INJECTION, SOLUTION ENDOTRACHEAL; INTRACARDIAC; INTRAVENOUS at 07:56

## 2023-01-01 RX ADMIN — SODIUM BICARBONATE 50 MEQ: 84 INJECTION, SOLUTION INTRAVENOUS at 07:39

## 2023-01-17 ENCOUNTER — TELEPHONE (OUTPATIENT)
Dept: INTERNAL MEDICINE CLINIC | Facility: CLINIC | Age: 48
End: 2023-01-17

## 2023-01-17 DIAGNOSIS — I50.32 CHRONIC DIASTOLIC CONGESTIVE HEART FAILURE (HCC): ICD-10-CM

## 2023-01-17 DIAGNOSIS — G47.30 SLEEP APNEA, UNSPECIFIED TYPE: Primary | ICD-10-CM

## 2023-01-17 NOTE — TELEPHONE ENCOUNTER
Albuterol Sulfate 108 MCG/ACT AEPB - Patient asked for the baby blue inhaler that it is better than the other one-with refills    [facility]

## 2023-01-18 DIAGNOSIS — I50.32 CHRONIC DIASTOLIC CONGESTIVE HEART FAILURE (HCC): ICD-10-CM

## 2023-01-18 DIAGNOSIS — G47.30 SLEEP APNEA, UNSPECIFIED TYPE: Primary | ICD-10-CM

## 2023-01-18 RX ORDER — ALBUTEROL SULFATE 90 UG/1
2 AEROSOL, METERED RESPIRATORY (INHALATION) EVERY 4 HOURS PRN
Qty: 18 G | Refills: 3 | Status: SHIPPED | OUTPATIENT
Start: 2023-01-18

## 2023-01-24 ENCOUNTER — TELEPHONE (OUTPATIENT)
Dept: NEPHROLOGY | Facility: CLINIC | Age: 48
End: 2023-01-24

## 2023-01-24 NOTE — TELEPHONE ENCOUNTER
I called patient to schedule a Nephrology Consult Ref by Dr Geovani Godoy for Hypertensive urgency- Abdominal pain, unspecified abdominal location as per patient he is on dialysis and there is no need to see a kidney provider   Referral will be close

## 2023-01-24 NOTE — TELEPHONE ENCOUNTER
I called and spoke to the patient and explained to the patient that he does not need to see Dr Cole Dye on 2/1/2023 for a Nephrology Consult because the patient is on dialysis  I did explain that I will be canceling his appointment and that he will continue seeing the doctors in the dialysis unit  The patient understood and was okay with it

## 2023-01-27 ENCOUNTER — CONSULT (OUTPATIENT)
Dept: PULMONOLOGY | Facility: CLINIC | Age: 48
End: 2023-01-27

## 2023-01-27 VITALS
TEMPERATURE: 98.2 F | SYSTOLIC BLOOD PRESSURE: 184 MMHG | BODY MASS INDEX: 29.48 KG/M2 | OXYGEN SATURATION: 92 % | DIASTOLIC BLOOD PRESSURE: 100 MMHG | WEIGHT: 194.5 LBS | HEIGHT: 68 IN | HEART RATE: 79 BPM

## 2023-01-27 DIAGNOSIS — I10 PRIMARY HYPERTENSION: ICD-10-CM

## 2023-01-27 DIAGNOSIS — G47.30 SLEEP APNEA, UNSPECIFIED TYPE: ICD-10-CM

## 2023-01-27 DIAGNOSIS — F51.01 PRIMARY INSOMNIA: ICD-10-CM

## 2023-01-27 DIAGNOSIS — G47.33 OSA (OBSTRUCTIVE SLEEP APNEA): ICD-10-CM

## 2023-01-27 DIAGNOSIS — R91.8 ABNORMAL FINDING ON LUNG IMAGING: ICD-10-CM

## 2023-01-27 DIAGNOSIS — E66.3 OVERWEIGHT (BMI 25.0-29.9): ICD-10-CM

## 2023-01-27 DIAGNOSIS — N18.6 ESRD (END STAGE RENAL DISEASE) (HCC): ICD-10-CM

## 2023-01-27 DIAGNOSIS — R06.02 SHORTNESS OF BREATH: Primary | ICD-10-CM

## 2023-01-27 NOTE — PROGRESS NOTES
Assessment/Plan:     Diagnoses and all orders for this visit:    Shortness of breath  -     Ambulatory referral to Pulmonology  -     Complete PFT with post Bronchodilator and Six Minute walk; Future    Abnormal finding on lung imaging  -     Ambulatory referral to Pulmonology  -     CT chest without contrast; Future    TRESA (obstructive sleep apnea)  -     Diagnostic Sleep Study; Future    Overweight (BMI 25 0-29  9)    Primary insomnia    ESRD (end stage renal disease) ([facility] [address] )    Primary hypertension          Plan for follow up:  Shortness of breath and dyspnea on exertion likely multifactorial secondary to his asthma by history mild persistent asthma also with end-stage renal disease on hemodialysis  Will get complete PFT with postbronchodilator and a 6-minute walk test  Recent CT of the chest demonstrating small irregular linear and tubular opacities in the periphery infectious versus inflammatory also with mildly enlarged mediastinal lymph nodes largest being around 1 2 cm right lower paratracheal likely reactive, status post completion of antibiotic while he was in the hospital in December  Will repeat CT of the chest in 8 weeks from then for follow-up and stability of these opacities as well as the lymph node  Once he reaches the age of 46 he is a candidate for annual lung cancer screening with his prior smoking history understands and verbalizes  Insomnia sleep onset as well as sleep maintenance insomnia  Sleep onset insomnia discussed regarding sleep hygiene as well as to continue with the trazodone 50 mg to be taken an hour prior to the bedtime, if still has an issue discussed to he can bump it up to 200 mg at bedtime  Sleep maintenance insomnia likely secondary to his sleep disordered breathing  Etiology pathogenesis of obstructive sleep apnea discussed in detail  Consequences of untreated sleep apnea discussed  Testing procedure was discussed  He will have an all-night polysomnogram and if there is evidence of abnormal nocturnal breathing he will undergo a CPAP titration study for maximal benefit  Cautioned against driving when sleepy  Recommend weight loss  Follow-up after his about testing or as needed earlier as needed  He did not wait for the testing to be scheduled he said to mail it to him he states he has been having chronic nausea and wants to go home, offered to see if he wants to go into the ER he says he has no vomiting there is the nausea which has been chronic, no chest pain or worsening shortness of breath, discussed with nursing to get the testing schedule and mail it as per patient's request   No follow-ups on file  All questions are answered to the patient's satisfaction and understanding  He verbalizes understanding  He is encouraged to call with any further questions or concerns  Portions of the record may have been created with voice recognition software  Occasional wrong word or "sound a like" substitutions may have occurred due to the inherent limitations of voice recognition software  Read the chart carefully and recognize, using context, where substitutions have occurred  a    Electronically Signed by Rc Sumner MD    ______________________________________________________________________    Chief Complaint:   Chief Complaint   Patient presents with   • Shortness of Breath   • Cough   • Wheezing   • Sleep Apnea        Patient ID: Mark Khalil is a 50 y o  y o  male has a past medical history of Hypertension and Renal disorder  1/27/2023  Patient presents today for initial visit      Patient is a very pleasant 42-year-old gentleman, with prior extensive smoking history states he used to smoke about 2 packs a day for several years quit in 2010 he has greater than 25-pack-year smoking history he states, also was diagnosed with asthma and has been on albuterol inhaler as needed he is here for evaluation for shortness of breath and dyspnea on exertion also has history of cough and occasional wheezing he states no history of or suspicion for mold exposure does have definite triggering factors with weather changes and exertion he states  Currently requiring his albuterol MDI 3-4 times a day he states  Occasional nocturnal awakenings once or twice in the month he states  He has history of hypertension end-stage renal disease on hemodialysis has had multiple admissions into the hospital with hypertensive urgency, also in his recent hospitalization in December 2022 was found to have an abnormal CT of the chest for which he is referred  Also high clinical suspicion for sleep disordered breathing for which he was also referred by his PCP  He states he goes to bed at around 10 PM takes approximately 2 hours to fall asleep he is on trazodone 50 mg that he takes and he states his out of bed at 10 AM on the days that he does not have to go to the dialysis on the days that he has to go for dialysis he is out of the bed at 5 AM he has multiple nocturnal awakenings he states almost every hour 2 or 3 times he has choking and gasping for air he states, no history of any early morning headaches no symptoms related to restless legs      Shortness of Breath  Associated symptoms include coughing, fatigue and wheezing  Cough  Associated symptoms include shortness of breath and wheezing  Wheezing  Associated symptoms include coughing, fatigue and wheezing  Occupational/Exposure history: 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  Pets/Enviroment: Dogs  Travel history: None  Review of Systems   Constitutional: Positive for fatigue  HENT: Negative  Eyes: Negative  Respiratory: Positive for cough, shortness of breath and wheezing  Loud snoring witnessed apneic spells    Cardiovascular: Negative  Gastrointestinal: Positive for nausea  Endocrine: Negative  Genitourinary: Negative  Musculoskeletal: Negative  Allergic/Immunologic: Negative  Neurological: Negative  Hematological: Negative  Psychiatric/Behavioral: Positive for sleep disturbance  Social history: He reports that he quit smoking about 12 years ago  His smoking use included cigarettes  He smoked an average of 2 packs per day  He has never used smokeless tobacco  He reports that he does not currently use alcohol  He reports current drug use  Drug: Other  Past surgical history: History reviewed  No pertinent surgical history  Family history: History reviewed  No pertinent family history      Immunization History   Administered Date(s) Administered   • Hep A / Hep B 07/02/2015   • Hepatitis B 01/01/1999   • INFLUENZA 11/29/2022   • Influenza Quadrivalent 3 years and older 10/08/2020   • Influenza, seasonal, injectable 10/16/2014   • Influenza, seasonal, injectable, preservative free 10/19/2021   • Pneumococcal Conjugate 13-Valent 10/16/2014, 12/03/2020   • Pneumococcal Polysaccharide PPV23 01/01/2010   • Tuberculin Skin Test-PPD Intradermal 02/26/2020, 03/09/2021     Current Outpatient Medications   Medication Sig Dispense Refill   • albuterol (PROVENTIL HFA,VENTOLIN HFA) 90 mcg/act inhaler Inhale 2 puffs every 4 (four) hours as needed for wheezing 18 g 3   • Albuterol Sulfate 108 (90 Base) MCG/ACT AEPB Inhale 2 puffs every 6 (six) hours as needed (Shortness of Breath) 3 each 1   • atorvastatin (LIPITOR) 80 mg tablet TAKE 1 TABLET BY MOUTH EVERY DAY 60 tablet 0   • calcium acetate (CALPHRON) 667 mg Take by mouth 2 (two) times a day as needed (With snacks)     • cloNIDine (CATAPRES-TTS-3) 0 3 mg/24 hr PLACE 1 PATCH (0 3 MG TOTAL) ON THE SKIN ONCE A WEEK 4 patch 1   • dicyclomine (BENTYL) 10 mg capsule TAKE 1 CAPSULE BY MOUTH 4 TIMES A DAY (BEFORE MEALS AND AT BEDTIME) 120 capsule 1   • dorzolamide-timolol (COSOPT) 22 3-6 8 MG/ML ophthalmic solution Administer 1 drop to both eyes 2 (two) times a day     • ergocalciferol (ERGOCALCIFEROL) 1 25 MG (53854 UT) capsule Take 50,000 Units by mouth every 7 days • isosorbide mononitrate (IMDUR) 30 mg 24 hr tablet TAKE 1 TABLET BY MOUTH EVERY DAY   INS? 60 tablet 0   • labetalol (NORMODYNE) 200 mg tablet TAKE 3 TABLETS (600 MG TOTAL) BY MOUTH 2 (TWO) TIMES A  tablet 0   • minoxidil (LONITEN) 2 5 mg tablet TAKE 1 TABLET BY MOUTH TWICE A  tablet 0   • NIFEdipine ER (ADALAT CC) 60 MG 24 hr tablet TAKE 1 TABLET BY MOUTH 2 TIMES A DAY  120 tablet 0   • ondansetron (ZOFRAN-ODT) 4 mg disintegrating tablet Take 4 mg by mouth every 6 (six) hours as needed for nausea or vomiting     • pantoprazole (PROTONIX) 40 mg tablet Take 1 tablet (40 mg total) by mouth 2 (two) times a day 60 tablet 3   • traZODone (DESYREL) 50 mg tablet Take 1 tablet (50 mg total) by mouth daily at bedtime 90 tablet 3   • valsartan (DIOVAN) 320 MG tablet TAKE 1 TABLET BY MOUTH EVERY DAY 60 tablet 0   • lidocaine (LIDODERM) 5 % Apply 1 patch topically daily for 10 days Remove & Discard patch within 12 hours or as directed by MD Do not start before December 14, 2022  10 patch 0     No current facility-administered medications for this visit  Allergies: Patient has no known allergies  Objective:  Vitals:    01/27/23 0803   BP: (!) 184/100   Pulse: 79   Temp: 98 2 °F (36 8 °C)   SpO2: 92%   Weight: 88 2 kg (194 lb 8 oz)   Height: 5' 8" (1 727 m)   Oxygen Therapy  SpO2: 92 %    Wt Readings from Last 3 Encounters:   01/27/23 88 2 kg (194 lb 8 oz)   12/23/22 93 kg (205 lb)   12/13/22 87 kg (191 lb 12 8 oz)     Body mass index is 29 57 kg/m²  Physical Exam  Vitals and nursing note reviewed  Constitutional:       Appearance: He is well-developed  HENT:      Head: Normocephalic and atraumatic  Eyes:      Conjunctiva/sclera: Conjunctivae normal       Pupils: Pupils are equal, round, and reactive to light  Neck:      Thyroid: No thyromegaly  Vascular: No JVD  Cardiovascular:      Rate and Rhythm: Normal rate and regular rhythm  Heart sounds: Normal heart sounds   No murmur heard     No friction rub  No gallop  Pulmonary:      Effort: Pulmonary effort is normal  No respiratory distress  Breath sounds: Normal breath sounds  No wheezing or rales  Chest:      Chest wall: No tenderness  Musculoskeletal:         General: No tenderness or deformity  Normal range of motion  Cervical back: Normal range of motion and neck supple  Lymphadenopathy:      Cervical: No cervical adenopathy  Skin:     General: Skin is warm and dry  Neurological:      Mental Status: He is alert and oriented to person, place, and time             Diagnostics:  I have personally reviewed pertinent films in PACS  ESS: Total score: 4

## 2023-02-13 DIAGNOSIS — I16.0 HYPERTENSIVE URGENCY: ICD-10-CM

## 2023-02-13 DIAGNOSIS — R10.9 ABDOMINAL PAIN, UNSPECIFIED ABDOMINAL LOCATION: ICD-10-CM

## 2023-02-13 RX ORDER — DICYCLOMINE HYDROCHLORIDE 10 MG/1
CAPSULE ORAL
Qty: 120 CAPSULE | Refills: 1 | Status: SHIPPED | OUTPATIENT
Start: 2023-02-13

## 2023-03-03 ENCOUNTER — TELEPHONE (OUTPATIENT)
Dept: GASTROENTEROLOGY | Facility: CLINIC | Age: 48
End: 2023-03-03

## 2023-03-03 NOTE — TELEPHONE ENCOUNTER
Patients GI provider:  Bernabe CHAVEZ    Number to return call: [phone]    Reason for call: Pt calling because he is having vomiting when he coughs  He was vomiting the entire time he was on the phone to schedule an appointment   He would like to know if he can be seen sooner    Scheduled procedure/appointment date if applicable: Appt 2/82/08

## 2023-03-07 PROBLEM — Z99.2 STAGE 5 CHRONIC KIDNEY DISEASE ON CHRONIC DIALYSIS (HCC): Status: ACTIVE | Noted: 2023-01-01

## 2023-03-07 PROBLEM — N18.4 CHRONIC KIDNEY DISEASE, STAGE 4 (SEVERE) (HCC): Status: ACTIVE | Noted: 2018-11-02

## 2023-03-07 PROBLEM — N18.6 STAGE 5 CHRONIC KIDNEY DISEASE ON CHRONIC DIALYSIS (HCC): Status: ACTIVE | Noted: 2023-01-01

## 2023-03-07 PROBLEM — D69.6 THROMBOCYTOPENIA (HCC): Status: ACTIVE | Noted: 2018-01-01

## 2023-03-07 PROBLEM — F17.200 SMOKER: Status: ACTIVE | Noted: 2017-09-07

## 2023-03-07 PROBLEM — N18.9 ANEMIA OF RENAL DISEASE: Status: ACTIVE | Noted: 2018-12-11

## 2023-03-07 PROBLEM — E87.20 METABOLIC ACIDOSIS: Status: ACTIVE | Noted: 2021-05-11

## 2023-03-07 PROBLEM — D63.1 ANEMIA OF RENAL DISEASE: Status: ACTIVE | Noted: 2018-12-11

## 2023-03-07 PROBLEM — E87.5 HYPERKALEMIA: Status: ACTIVE | Noted: 2023-01-01

## 2023-03-07 PROBLEM — N04.9 NEPHROTIC SYNDROME: Status: ACTIVE | Noted: 2018-11-27

## 2023-03-07 PROBLEM — J18.9 PNEUMONIA: Status: ACTIVE | Noted: 2019-01-01

## 2023-03-07 PROBLEM — J81.0 ACUTE NONCARDIOGENIC PULMONARY EDEMA (HCC): Status: ACTIVE | Noted: 2020-01-28

## 2023-03-07 PROBLEM — B34.8 INFECTION DUE TO PARAINFLUENZA VIRUS 3: Status: ACTIVE | Noted: 2019-06-02

## 2023-03-07 PROBLEM — Z76.82 PATIENT AWAITING RENAL TRANSPLANT: Status: ACTIVE | Noted: 2019-09-09

## 2023-03-07 PROBLEM — B18.2 CHRONIC HEPATITIS C VIRUS INFECTION (HCC): Status: ACTIVE | Noted: 2018-11-29

## 2023-03-08 NOTE — PATIENT INSTRUCTIONS
May take tylenol every 4-6 hours as needed for pain  Ice every 20 minutes every 3-4 hours as needed for next 24-48 hours then switch to heat  Follow-up with PCP in 3-5 days if no improvement of symptoms  Report to ER if symptoms worsen
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
